# Patient Record
Sex: FEMALE | Race: WHITE | NOT HISPANIC OR LATINO | Employment: FULL TIME | URBAN - METROPOLITAN AREA
[De-identification: names, ages, dates, MRNs, and addresses within clinical notes are randomized per-mention and may not be internally consistent; named-entity substitution may affect disease eponyms.]

---

## 2024-11-18 ENCOUNTER — OFFICE VISIT (OUTPATIENT)
Dept: ORTHOPEDIC SURGERY | Facility: CLINIC | Age: 44
End: 2024-11-18
Payer: MEDICAID

## 2024-11-18 VITALS
DIASTOLIC BLOOD PRESSURE: 78 MMHG | BODY MASS INDEX: 24.04 KG/M2 | HEART RATE: 69 BPM | HEIGHT: 67 IN | WEIGHT: 153.2 LBS | SYSTOLIC BLOOD PRESSURE: 113 MMHG

## 2024-11-18 DIAGNOSIS — S83.511A RUPTURE OF ANTERIOR CRUCIATE LIGAMENT OF RIGHT KNEE, INITIAL ENCOUNTER: ICD-10-CM

## 2024-11-18 DIAGNOSIS — M25.561 RIGHT KNEE PAIN, UNSPECIFIED CHRONICITY: Primary | ICD-10-CM

## 2024-11-18 DIAGNOSIS — S83.231A COMPLEX TEAR OF MEDIAL MENISCUS OF RIGHT KNEE AS CURRENT INJURY, INITIAL ENCOUNTER: ICD-10-CM

## 2024-11-18 RX ORDER — ACETAMINOPHEN 325 MG/1
1000 TABLET ORAL ONCE
OUTPATIENT
Start: 2024-11-18 | End: 2024-11-18

## 2024-11-18 RX ORDER — PREGABALIN 150 MG/1
150 CAPSULE ORAL ONCE
OUTPATIENT
Start: 2024-11-18 | End: 2024-11-18

## 2024-11-18 RX ORDER — MELOXICAM 7.5 MG/1
15 TABLET ORAL ONCE
OUTPATIENT
Start: 2024-11-18 | End: 2024-11-18

## 2024-11-18 NOTE — PROGRESS NOTES
Subjective:     Patient ID: Becca Fonseca is a 44 y.o. female.    Chief Complaint:  Right knee pain, new patient    History of Present Illness  History of Present Illness  Becca presents clinic today for evaluation of right knee pain and instability initially started with an episode when she fell in a mole hole approximately 5 years ago which caused her knee to twist with a sudden onset of sharp pain and a pop as well as swelling.  Since then she has had intermittent instability episodes to her knee which she is treated primarily with bracing, activity modification, icing, and anti-inflammatory medications.  However approximately 6 to 7 weeks ago she started having increasing symptoms with instability as well as new onset of locking with pain localized primarily to the medial aspect of her knee at that point in time.  Since then she has had to wear a brace fairly consistently in order to do basic activities of daily living even on flat level ground.  Pain is symptoms are exacerbated with any uneven surfaces or rotational activities especially with any kind of cutting or pivoting.  Rates associated pain with these episodes happen as a 7-8 out of 10 dull in nature with associated sharp type pain as well particular of the medial joint line.  She has noted associated swelling stiffness and popping and catching and clicking as noted above.  Symptoms are worse with standing uneven surfaces walking working running and climbing stairs.  She has not had any intra-articular injections or previous surgery.  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     Social History     Occupational History    Not on file   Tobacco Use    Smoking status: Every Day     Current packs/day: 0.75     Average packs/day: 0.8 packs/day for 3.9 years (2.9 ttl pk-yrs)     Types: Cigarettes     Start date: 2021     Passive exposure: Current    Smokeless tobacco: Never   Vaping Use    Vaping status: Never Used   Substance  "and Sexual Activity    Alcohol use: Yes     Comment: 1    Drug use: Never    Sexual activity: Defer      History reviewed. No pertinent past medical history.  Past Surgical History:   Procedure Laterality Date    TUBAL ABDOMINAL LIGATION  2007       History reviewed. No pertinent family history.      Review of Systems        Objective:  Vitals:    11/18/24 1432   BP: 113/78   Pulse: 69   Weight: 69.5 kg (153 lb 3.2 oz)   Height: 170.2 cm (67\")         11/18/24  1432   Weight: 69.5 kg (153 lb 3.2 oz)     Body mass index is 23.99 kg/m².  Physical Exam    Vital signs reviewed.   General: No acute distress, alert and oriented  Eyes: conjunctiva clear; pupils equally round and reactive  ENT: external ears and nose atraumatic; oropharynx clear  CV: no peripheral edema  Resp: normal respiratory effort  Skin: no rashes or wounds; normal turgor  Psych: mood and affect appropriate; recent and remote memory intact          Physical Exam         Right knee-active range of motion 2 to 125 degrees, 4+ out of 5 strength on flexion extension, grade 2B Lachman, 2+ tender drawer soft endpoint, negative posterior drawer, negative posterior lateral drawer, negative dial test stable to varus and valgus stress at 2 and 30 degrees.  Mild effusion.  Significant tenderness along medial joint line with positive Jeffery exam with pain as well as clicking.  Minimally positive active patellar compression test.  No hip pain on logroll or Stinchfield exam.  Negative straight leg raise left lower extremity.  Brisk cap refill all digits, 2+ dorsalis pedis pulse.    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      Right Knee X-Ray  Indication: Pain    AP, Lateral, and [place] views    Findings:  No fracture  No bony lesion  Normal soft tissues  Normal joint spaces    No " prior studies were available for comparison.      Assessment:        1. Right knee pain, unspecified chronicity    2. Rupture of anterior cruciate ligament of right knee, initial encounter    3. Complex tear of medial meniscus of right knee as current injury, initial encounter           Plan:          Assessment & Plan  Reviewed treatment options at length with patient in regards to her right knee including but not limited to observation, bracing, physical therapy, and surgical treatment with ACL reconstruction and meniscal repair versus meniscectomy.  At this point in time given failure of conservative treatment as noted above in HPI as well as persistent lack of confidence and instability in her knee with advanced imaging consistent with ACL rupture and meniscal tear, we discussed options and she wishes to proceed with surgical treatment at this point in time.    Patient would like to proceed with right knee ACL reconstruction with hamstring autograft, possible allograft supplement, meniscal and cartilage surgery as indicated and all associated procedures. Reviewed anatomy of the knee, as well as details of procedure, including risks, benefits, and alternatives. Risks discussed included but were not limited to bleeding, infection, neurovascular damage, re-tear of ACL graft, failure of incorporation of graft, disease transmission, chronic pain, laxity, swelling, stiffness, loss of motion, weakness, instability, fracture, re-tear of meniscus, DVT, pulmonary embolus, death, stroke, complex regional pain syndrome, myocardial infarction, and need for additional procedures. Patient understood all these and had all questions answered, and verbally consented to proceeding with surgery. We discussed typical postop recovery of 6-12 months and no guarantee of being able to return to previous activities. No guarantees were given in regards to the procedure. We will plan on proceeding with surgery at next available date.      We did discuss that if we do proceed with repair she would likely need to be on limited weightbearing status for up to 4 to 6 weeks afterwards.  She understood this and does wish to proceed.    She denies any history of DVT or pulmonary embolus, denies cardiac history, she is not diabetic      Becca Fonseca was in agreement with plan and had all questions answered.     Orders:  Orders Placed This Encounter   Procedures    IMAGING SCANNED    XR Knee 3+ View With Levan Right       Medications:  No orders of the defined types were placed in this encounter.      Followup:  No follow-ups on file.    Diagnoses and all orders for this visit:    1. Right knee pain, unspecified chronicity (Primary)  -     XR Knee 3+ View With Levan Right    2. Rupture of anterior cruciate ligament of right knee, initial encounter    3. Complex tear of medial meniscus of right knee as current injury, initial encounter    Other orders  -     IMAGING SCANNED          BMI cannot be calculated due to outdated height or weight values.  Please input a current height/weight in Vitals and re-renter BMIFOLLOWUP in Note to pull in correct documentation based on BMI range.     Becca Fonseca  reports that she has been smoking cigarettes. She started smoking about 3 years ago. She has a 2.9 pack-year smoking history. She has been exposed to tobacco smoke. She has never used smokeless tobacco. I have educated her on the risk of diseases from using tobacco products such as cancer, COPD, and heart disease.     I advised her to quit and she is willing to quit. We have discussed the following method/s for tobacco cessation:  Education Material.  Together we have set a quit date for 2 weeks from today.   I spent 3  minutes counseling the patient.           Dictated utilizing Dragon dictation     Patient or patient representative verbalized consent for the use of Ambient Listening during the visit with  David Mortensen MD for chart  documentation. 11/18/2024  14:50 EST

## 2024-11-25 ENCOUNTER — PATIENT ROUNDING (BHMG ONLY) (OUTPATIENT)
Dept: ORTHOPEDIC SURGERY | Facility: CLINIC | Age: 44
End: 2024-11-25
Payer: MEDICAID

## 2024-11-25 NOTE — PROGRESS NOTES
A card has been sent to the patient for PATIENT ROUNDING with [facility].

## 2024-12-09 ENCOUNTER — PRE-ADMISSION TESTING (OUTPATIENT)
Dept: PREADMISSION TESTING | Facility: HOSPITAL | Age: 44
End: 2024-12-09

## 2024-12-09 VITALS
OXYGEN SATURATION: 99 % | WEIGHT: 153 LBS | HEART RATE: 83 BPM | BODY MASS INDEX: 24.01 KG/M2 | HEIGHT: 67 IN | RESPIRATION RATE: 16 BRPM | SYSTOLIC BLOOD PRESSURE: 118 MMHG | DIASTOLIC BLOOD PRESSURE: 73 MMHG

## 2024-12-09 DIAGNOSIS — S83.231A COMPLEX TEAR OF MEDIAL MENISCUS OF RIGHT KNEE AS CURRENT INJURY, INITIAL ENCOUNTER: ICD-10-CM

## 2024-12-09 DIAGNOSIS — S83.511A RUPTURE OF ANTERIOR CRUCIATE LIGAMENT OF RIGHT KNEE, INITIAL ENCOUNTER: ICD-10-CM

## 2024-12-09 LAB
APTT PPP: 23.5 SECONDS (ref 24.3–38.1)
BASOPHILS # BLD AUTO: 0.07 10*3/MM3 (ref 0–0.2)
BASOPHILS NFR BLD AUTO: 0.7 % (ref 0–1.5)
DEPRECATED RDW RBC AUTO: 41.1 FL (ref 37–54)
EOSINOPHIL # BLD AUTO: 0.13 10*3/MM3 (ref 0–0.4)
EOSINOPHIL NFR BLD AUTO: 1.3 % (ref 0.3–6.2)
ERYTHROCYTE [DISTWIDTH] IN BLOOD BY AUTOMATED COUNT: 12.3 % (ref 12.3–15.4)
HBA1C MFR BLD: 5.33 % (ref 4.8–5.6)
HCG SERPL QL: NEGATIVE
HCT VFR BLD AUTO: 39.5 % (ref 34–46.6)
HGB BLD-MCNC: 13.2 G/DL (ref 12–15.9)
IMM GRANULOCYTES # BLD AUTO: 0.02 10*3/MM3 (ref 0–0.05)
IMM GRANULOCYTES NFR BLD AUTO: 0.2 % (ref 0–0.5)
INR PPP: 0.85 (ref 0.9–1.1)
LYMPHOCYTES # BLD AUTO: 2.57 10*3/MM3 (ref 0.7–3.1)
LYMPHOCYTES NFR BLD AUTO: 26 % (ref 19.6–45.3)
MCH RBC QN AUTO: 30.8 PG (ref 26.6–33)
MCHC RBC AUTO-ENTMCNC: 33.4 G/DL (ref 31.5–35.7)
MCV RBC AUTO: 92.1 FL (ref 79–97)
MONOCYTES # BLD AUTO: 0.69 10*3/MM3 (ref 0.1–0.9)
MONOCYTES NFR BLD AUTO: 7 % (ref 5–12)
NEUTROPHILS NFR BLD AUTO: 6.41 10*3/MM3 (ref 1.7–7)
NEUTROPHILS NFR BLD AUTO: 64.8 % (ref 42.7–76)
NRBC BLD AUTO-RTO: 0 /100 WBC (ref 0–0.2)
PLATELET # BLD AUTO: 256 10*3/MM3 (ref 140–450)
PMV BLD AUTO: 10.3 FL (ref 6–12)
PROTHROMBIN TIME: 12 SECONDS (ref 12.1–15)
RBC # BLD AUTO: 4.29 10*6/MM3 (ref 3.77–5.28)
WBC NRBC COR # BLD AUTO: 9.89 10*3/MM3 (ref 3.4–10.8)

## 2024-12-09 PROCEDURE — 85730 THROMBOPLASTIN TIME PARTIAL: CPT | Performed by: ORTHOPAEDIC SURGERY

## 2024-12-09 PROCEDURE — 84703 CHORIONIC GONADOTROPIN ASSAY: CPT | Performed by: ORTHOPAEDIC SURGERY

## 2024-12-09 PROCEDURE — 83036 HEMOGLOBIN GLYCOSYLATED A1C: CPT | Performed by: ORTHOPAEDIC SURGERY

## 2024-12-09 PROCEDURE — 85610 PROTHROMBIN TIME: CPT | Performed by: ORTHOPAEDIC SURGERY

## 2024-12-09 PROCEDURE — 36415 COLL VENOUS BLD VENIPUNCTURE: CPT

## 2024-12-09 PROCEDURE — 85025 COMPLETE CBC W/AUTO DIFF WBC: CPT | Performed by: ORTHOPAEDIC SURGERY

## 2024-12-09 NOTE — DISCHARGE INSTRUCTIONS
PRE-ADMISSION TESTING INSTRUCTIONS FOR ADULTS    Take these medications the morning of surgery with a small sip of water:  nothing       Do not take any insulin or diabetes medications the morning of surgery.      No aspirin, advil, aleve, ibuprofen, naproxen, diet pills, decongestants, or herbal/vitamins for a week prior to surgery.       Tylenol/Acetaminophen is okay to take if needed.    General Instructions:    DO NOT EAT SOLID FOOD AFTER MIDNIGHT THE NIGHT BEFORE SURGERY. No gum, mints, or hard candy after midnight the night before surgery.  You may drink clear liquids the day of surgery up until 2 hours before your arrival time.  Clear liquids are liquids you can see through. Nothing RED in color.    Plain water    Sports drinks      Gelatin (Jell-O)  Fruit juices without pulp such as white grape juice and apple juice  Popsicles that contain no fruit or yogurt  Tea or coffee (no cream or milk added)    It is beneficial for you to have a clear drink that contains carbohydrates 2 hours before your arrival time.  We suggest a 20 ounce bottle of Gatorade or Powerade for non-diabetic patients or a 20 ounce bottle of Gatorade Zero or Powerade Zero for diabetic patients.     Patients who avoid smoking, chewing tobacco and alcohol for 4 weeks prior to surgery have a reduced risk of post-operative complications.  If at all possible, quit smoking as many days before surgery as you can.    Do not smoke, use chewing tobacco or drink alcohol the day of surgery    Bring your C-PAP/ BI-PAP machine if you use one.  Wear clean comfortable clothes.  Do not wear contact lenses, lotion, deodorant, or make-up.  Bring a case for your glasses if applicable. You may brush your teeth the morning of surgery.  You may wear dentures/partials, do not put adhesive/glue on them.  Leave all other jewelry and valuables at home.      Preventing a Surgical Site Infection:    Shower the night before and on the morning of surgery using the  chlorhexidine soap you were given.  Use a clean washcloth with the soap.  Place clean sheets on your bed after showering the night before surgery. Do not use the CHG soap on your hair, face, or private areas. Wash your body gently for five (5) minutes. Do not scrub your skin.  Dry with a clean towel and dress in clean clothing.  Do not shave the surgical area for 10 days-2 weeks prior to surgery  because the razor can irritate skin and make it easier to develop an infection.  Make sure you, your family, and all healthcare providers clean their hands with soap and water or an alcohol based hand  before caring for you or your wound.      Day of surgery:    Your surgeon’s office will advise you of your arrival time for the day of surgery.    Upon arrival, a Pre-op nurse and Anesthesia provider will review your health history, obtain vital signs, and answer questions you may have. The anesthesia provider will also discuss the type of anesthesia that will be needed for your procedure, which may include general anesthesia. The only belongings needed at this time will be your home medications and if applicable your C-PAP/BI-PAP machine.  If you are staying overnight your family can leave the rest of your belongings in the car and bring them to your room later.  A Pre-op nurse will start an IV and you may receive medication in preparation for surgery, including something to help you relax.  Your family will be able to see you in the Pre-op area.  While you are in surgery your family should notify the waiting room  if they leave the waiting room area and provide a contact phone number.    IF you have any questions, you can call the Pre-Admission Department at [phone] or your surgeon's office.  Notify your surgeon if  you become sick, have a fever, productive cough, or cannot be here the day of surgery    Please be aware that surgery does come with discomfort.  We want to make every effort to  control your discomfort so please discuss any uncontrolled symptoms with your nurse.   Your doctor will most likely have prescribed pain medications.      If you are going home after surgery, you will receive individualized written care instructions before being discharged.  A responsible adult (over the age of 18) must drive you to and from the hospital on the day of your surgery and stay with you for 24 hours after anesthesia.    If you are staying overnight following surgery, you will be transported to your hospital room following the recovery period.  [facility] has all private rooms.    You may receive a survey regarding the care you received. Your feedback is very important and will be used to collect the necessary data to help us to continue to provide excellent care.     Deductibles and co-payments are collected on the day of service. Please be prepared to pay the required co-pay, deductible or deposit on the day of service as defined by your plan.

## 2024-12-11 ENCOUNTER — ANESTHESIA EVENT (OUTPATIENT)
Dept: PERIOP | Facility: HOSPITAL | Age: 44
End: 2024-12-11

## 2024-12-12 ENCOUNTER — ANESTHESIA (OUTPATIENT)
Dept: PERIOP | Facility: HOSPITAL | Age: 44
End: 2024-12-12

## 2024-12-12 ENCOUNTER — HOSPITAL ENCOUNTER (OUTPATIENT)
Facility: HOSPITAL | Age: 44
Setting detail: HOSPITAL OUTPATIENT SURGERY
Discharge: HOME | End: 2024-12-12
Attending: ORTHOPAEDIC SURGERY | Admitting: ORTHOPAEDIC SURGERY

## 2024-12-12 VITALS
HEART RATE: 59 BPM | TEMPERATURE: 98.8 F | BODY MASS INDEX: 23.18 KG/M2 | WEIGHT: 148 LBS | RESPIRATION RATE: 16 BRPM | SYSTOLIC BLOOD PRESSURE: 95 MMHG | DIASTOLIC BLOOD PRESSURE: 60 MMHG | OXYGEN SATURATION: 99 %

## 2024-12-12 DIAGNOSIS — S83.231A COMPLEX TEAR OF MEDIAL MENISCUS OF RIGHT KNEE AS CURRENT INJURY, INITIAL ENCOUNTER: ICD-10-CM

## 2024-12-12 DIAGNOSIS — S83.511A RUPTURE OF ANTERIOR CRUCIATE LIGAMENT OF RIGHT KNEE, INITIAL ENCOUNTER: ICD-10-CM

## 2024-12-12 PROCEDURE — 29888 ARTHRS AID ACL RPR/AGMNTJ: CPT | Performed by: SPECIALIST/TECHNOLOGIST, OTHER

## 2024-12-12 PROCEDURE — 25010000002 SUCCINYLCHOLINE PER 20 MG: Performed by: NURSE ANESTHETIST, CERTIFIED REGISTERED

## 2024-12-12 PROCEDURE — 25010000002 LIDOCAINE 2% SOLUTION: Performed by: NURSE ANESTHETIST, CERTIFIED REGISTERED

## 2024-12-12 PROCEDURE — C1713 ANCHOR/SCREW BN/BN,TIS/BN: HCPCS | Performed by: ORTHOPAEDIC SURGERY

## 2024-12-12 PROCEDURE — 25010000002 FENTANYL CITRATE (PF) 50 MCG/ML SOLUTION: Performed by: NURSE ANESTHETIST, CERTIFIED REGISTERED

## 2024-12-12 PROCEDURE — 25010000002 BUPIVACAINE (PF) 0.5 % SOLUTION

## 2024-12-12 PROCEDURE — 25010000002 MIDAZOLAM PER 1MG

## 2024-12-12 PROCEDURE — 25010000002 HYDROMORPHONE 1 MG/ML SOLUTION: Performed by: NURSE ANESTHETIST, CERTIFIED REGISTERED

## 2024-12-12 PROCEDURE — 25010000002 BUPIVACAINE (PF) 0.25 % SOLUTION

## 2024-12-12 PROCEDURE — 29888 ARTHRS AID ACL RPR/AGMNTJ: CPT | Performed by: ORTHOPAEDIC SURGERY

## 2024-12-12 PROCEDURE — 25810000003 SODIUM CHLORIDE 0.9 % SOLUTION

## 2024-12-12 PROCEDURE — 29882 ARTHRS KNE SRG MNISC RPR M/L: CPT | Performed by: SPECIALIST/TECHNOLOGIST, OTHER

## 2024-12-12 PROCEDURE — 25010000002 PROPOFOL 200 MG/20ML EMULSION: Performed by: NURSE ANESTHETIST, CERTIFIED REGISTERED

## 2024-12-12 PROCEDURE — 25010000002 LIDOCAINE PF 2% 2 % SOLUTION

## 2024-12-12 PROCEDURE — 29882 ARTHRS KNE SRG MNISC RPR M/L: CPT | Performed by: ORTHOPAEDIC SURGERY

## 2024-12-12 PROCEDURE — 25810000003 LACTATED RINGERS PER 1000 ML

## 2024-12-12 PROCEDURE — 25010000002 SUGAMMADEX 200 MG/2ML SOLUTION: Performed by: NURSE ANESTHETIST, CERTIFIED REGISTERED

## 2024-12-12 PROCEDURE — 25010000002 ONDANSETRON PER 1 MG

## 2024-12-12 PROCEDURE — 25010000002 DEXAMETHASONE PER 1 MG

## 2024-12-12 PROCEDURE — 25010000002 CEFAZOLIN PER 500 MG: Performed by: ORTHOPAEDIC SURGERY

## 2024-12-12 DEVICE — DEV CONTRL TISS STRATAFIX SPIRAL MNCRYL UD 3/0 PLS 30CM: Type: IMPLANTABLE DEVICE | Site: KNEE | Status: FUNCTIONAL

## 2024-12-12 DEVICE — SPIKED WASHER 14MM: Type: IMPLANTABLE DEVICE | Site: KNEE | Status: FUNCTIONAL

## 2024-12-12 DEVICE — BIOSURE REGENSORB INTERFERENCE                                    SCREW 8 MM X 25MM
Type: IMPLANTABLE DEVICE | Site: KNEE | Status: FUNCTIONAL
Brand: BIOSURE

## 2024-12-12 DEVICE — SUT NONABS LOOPED W/STR/NDL COBR SZ2 20IN WHT/BLU: Type: IMPLANTABLE DEVICE | Site: KNEE | Status: FUNCTIONAL

## 2024-12-12 DEVICE — FAST-FIX 360 REVERSED CURVE                                    MENISCAL REPAIR SYSTEM
Type: IMPLANTABLE DEVICE | Site: KNEE | Status: FUNCTIONAL
Brand: FAST-FIX

## 2024-12-12 DEVICE — ULTRABRAID NO.2 WHITE SUTURE AND                                    NEEDLE ASSEMBLY, 38 INCH, 10 PER                                    BOX, STERILE
Type: IMPLANTABLE DEVICE | Site: KNEE | Status: FUNCTIONAL
Brand: ULTRABRAID

## 2024-12-12 DEVICE — SUT NONABS LOOPED W/STR/NDL SZ2 20IN BLU: Type: IMPLANTABLE DEVICE | Site: KNEE | Status: FUNCTIONAL

## 2024-12-12 DEVICE — ULTRABUTTON ADJUSTABLE FIXATION DEVICE
Type: IMPLANTABLE DEVICE | Site: KNEE | Status: FUNCTIONAL
Brand: ULTRABUTTON

## 2024-12-12 DEVICE — ULTRATAPE 2MM BLUE 38 PKG OF 6: Type: IMPLANTABLE DEVICE | Site: KNEE | Status: FUNCTIONAL

## 2024-12-12 DEVICE — CORTICAL SCREW 4.5MM X 32MM: Type: IMPLANTABLE DEVICE | Site: KNEE | Status: FUNCTIONAL

## 2024-12-12 DEVICE — FAST-FIX 360 CURVED MENISCAL                                    REPAIR SYSTEM
Type: IMPLANTABLE DEVICE | Site: KNEE | Status: FUNCTIONAL
Brand: FAST-FIX

## 2024-12-12 RX ORDER — SODIUM CHLORIDE 9 MG/ML
40 INJECTION, SOLUTION INTRAVENOUS AS NEEDED
Status: DISCONTINUED | OUTPATIENT
Start: 2024-12-12 | End: 2024-12-12 | Stop reason: HOSPADM

## 2024-12-12 RX ORDER — ROCURONIUM BROMIDE 10 MG/ML
INJECTION, SOLUTION INTRAVENOUS AS NEEDED
Status: DISCONTINUED | OUTPATIENT
Start: 2024-12-12 | End: 2024-12-12 | Stop reason: SURG

## 2024-12-12 RX ORDER — BUPIVACAINE HYDROCHLORIDE 2.5 MG/ML
INJECTION, SOLUTION EPIDURAL; INFILTRATION; INTRACAUDAL
Status: COMPLETED | OUTPATIENT
Start: 2024-12-12 | End: 2024-12-12

## 2024-12-12 RX ORDER — MINERAL OIL
OIL (ML) MISCELLANEOUS AS NEEDED
Status: DISCONTINUED | OUTPATIENT
Start: 2024-12-12 | End: 2024-12-12 | Stop reason: HOSPADM

## 2024-12-12 RX ORDER — SENNOSIDES A AND B 8.6 MG/1
1 TABLET, FILM COATED ORAL NIGHTLY
Qty: 20 TABLET | Refills: 0 | Status: SHIPPED | OUTPATIENT
Start: 2024-12-12

## 2024-12-12 RX ORDER — DEXAMETHASONE SODIUM PHOSPHATE 10 MG/ML
8 INJECTION INTRAMUSCULAR; INTRAVENOUS ONCE AS NEEDED
Status: COMPLETED | OUTPATIENT
Start: 2024-12-12 | End: 2024-12-12

## 2024-12-12 RX ORDER — LIDOCAINE HYDROCHLORIDE 20 MG/ML
INJECTION, SOLUTION EPIDURAL; INFILTRATION; INTRACAUDAL; PERINEURAL
Status: COMPLETED | OUTPATIENT
Start: 2024-12-12 | End: 2024-12-12

## 2024-12-12 RX ORDER — MELOXICAM 7.5 MG/1
15 TABLET ORAL ONCE
Status: COMPLETED | OUTPATIENT
Start: 2024-12-12 | End: 2024-12-12

## 2024-12-12 RX ORDER — DEXMEDETOMIDINE HYDROCHLORIDE 100 UG/ML
INJECTION, SOLUTION INTRAVENOUS
Status: COMPLETED | OUTPATIENT
Start: 2024-12-12 | End: 2024-12-12

## 2024-12-12 RX ORDER — LIDOCAINE HYDROCHLORIDE 10 MG/ML
0.5 INJECTION, SOLUTION EPIDURAL; INFILTRATION; INTRACAUDAL; PERINEURAL ONCE AS NEEDED
Status: DISCONTINUED | OUTPATIENT
Start: 2024-12-12 | End: 2024-12-12 | Stop reason: HOSPADM

## 2024-12-12 RX ORDER — SCOLOPAMINE TRANSDERMAL SYSTEM 1 MG/1
1 PATCH, EXTENDED RELEASE TRANSDERMAL
Status: DISCONTINUED | OUTPATIENT
Start: 2024-12-12 | End: 2024-12-12 | Stop reason: HOSPADM

## 2024-12-12 RX ORDER — PREGABALIN 75 MG/1
150 CAPSULE ORAL ONCE
Status: COMPLETED | OUTPATIENT
Start: 2024-12-12 | End: 2024-12-12

## 2024-12-12 RX ORDER — ONDANSETRON 2 MG/ML
4 INJECTION INTRAMUSCULAR; INTRAVENOUS ONCE AS NEEDED
Status: COMPLETED | OUTPATIENT
Start: 2024-12-12 | End: 2024-12-12

## 2024-12-12 RX ORDER — FAMOTIDINE 10 MG/ML
20 INJECTION, SOLUTION INTRAVENOUS
Status: COMPLETED | OUTPATIENT
Start: 2024-12-12 | End: 2024-12-12

## 2024-12-12 RX ORDER — ONDANSETRON 4 MG/1
4 TABLET, FILM COATED ORAL EVERY 8 HOURS PRN
Qty: 30 TABLET | Refills: 0 | Status: SHIPPED | OUTPATIENT
Start: 2024-12-12

## 2024-12-12 RX ORDER — OXYCODONE AND ACETAMINOPHEN 5; 325 MG/1; MG/1
1 TABLET ORAL EVERY 4 HOURS PRN
Qty: 42 TABLET | Refills: 0 | Status: SHIPPED | OUTPATIENT
Start: 2024-12-12

## 2024-12-12 RX ORDER — DOXYCYCLINE 100 MG/1
100 CAPSULE ORAL 2 TIMES DAILY
Qty: 20 CAPSULE | Refills: 0 | Status: SHIPPED | OUTPATIENT
Start: 2024-12-12 | End: 2024-12-22

## 2024-12-12 RX ORDER — SUCCINYLCHOLINE CHLORIDE 20 MG/ML
INJECTION INTRAMUSCULAR; INTRAVENOUS AS NEEDED
Status: DISCONTINUED | OUTPATIENT
Start: 2024-12-12 | End: 2024-12-12 | Stop reason: SURG

## 2024-12-12 RX ORDER — ASPIRIN 81 MG/1
81 TABLET ORAL 2 TIMES DAILY
Qty: 28 TABLET | Refills: 0 | Status: SHIPPED | OUTPATIENT
Start: 2024-12-12 | End: 2024-12-26

## 2024-12-12 RX ORDER — PROPOFOL 10 MG/ML
INJECTION, EMULSION INTRAVENOUS AS NEEDED
Status: DISCONTINUED | OUTPATIENT
Start: 2024-12-12 | End: 2024-12-12 | Stop reason: SURG

## 2024-12-12 RX ORDER — ACETAMINOPHEN 500 MG
1000 TABLET ORAL ONCE
Status: COMPLETED | OUTPATIENT
Start: 2024-12-12 | End: 2024-12-12

## 2024-12-12 RX ORDER — ONDANSETRON 2 MG/ML
4 INJECTION INTRAMUSCULAR; INTRAVENOUS ONCE AS NEEDED
Status: DISCONTINUED | OUTPATIENT
Start: 2024-12-12 | End: 2024-12-12 | Stop reason: HOSPADM

## 2024-12-12 RX ORDER — SODIUM CHLORIDE, SODIUM LACTATE, POTASSIUM CHLORIDE, CALCIUM CHLORIDE 600; 310; 30; 20 MG/100ML; MG/100ML; MG/100ML; MG/100ML
100 INJECTION, SOLUTION INTRAVENOUS ONCE
Status: DISCONTINUED | OUTPATIENT
Start: 2024-12-12 | End: 2024-12-12 | Stop reason: HOSPADM

## 2024-12-12 RX ORDER — MIDAZOLAM HYDROCHLORIDE 2 MG/2ML
1 INJECTION, SOLUTION INTRAMUSCULAR; INTRAVENOUS
Status: DISCONTINUED | OUTPATIENT
Start: 2024-12-12 | End: 2024-12-12 | Stop reason: HOSPADM

## 2024-12-12 RX ORDER — SODIUM CHLORIDE, SODIUM LACTATE, POTASSIUM CHLORIDE, CALCIUM CHLORIDE 600; 310; 30; 20 MG/100ML; MG/100ML; MG/100ML; MG/100ML
100 INJECTION, SOLUTION INTRAVENOUS CONTINUOUS
Status: DISCONTINUED | OUTPATIENT
Start: 2024-12-12 | End: 2024-12-12 | Stop reason: HOSPADM

## 2024-12-12 RX ORDER — HYDROCODONE BITARTRATE AND ACETAMINOPHEN 7.5; 325 MG/1; MG/1
1 TABLET ORAL ONCE AS NEEDED
Status: COMPLETED | OUTPATIENT
Start: 2024-12-12 | End: 2024-12-12

## 2024-12-12 RX ORDER — SODIUM CHLORIDE 0.9 % (FLUSH) 0.9 %
10 SYRINGE (ML) INJECTION EVERY 12 HOURS SCHEDULED
Status: DISCONTINUED | OUTPATIENT
Start: 2024-12-12 | End: 2024-12-12 | Stop reason: HOSPADM

## 2024-12-12 RX ORDER — LIDOCAINE HYDROCHLORIDE 20 MG/ML
INJECTION, SOLUTION INFILTRATION; PERINEURAL AS NEEDED
Status: DISCONTINUED | OUTPATIENT
Start: 2024-12-12 | End: 2024-12-12 | Stop reason: SURG

## 2024-12-12 RX ORDER — SODIUM CHLORIDE 0.9 % (FLUSH) 0.9 %
10 SYRINGE (ML) INJECTION AS NEEDED
Status: DISCONTINUED | OUTPATIENT
Start: 2024-12-12 | End: 2024-12-12 | Stop reason: HOSPADM

## 2024-12-12 RX ORDER — TRANEXAMIC ACID 10 MG/ML
1000 INJECTION, SOLUTION INTRAVENOUS ONCE
Status: COMPLETED | OUTPATIENT
Start: 2024-12-12 | End: 2024-12-12

## 2024-12-12 RX ORDER — METHOCARBAMOL 500 MG/1
750 TABLET, FILM COATED ORAL 4 TIMES DAILY
Status: DISCONTINUED | OUTPATIENT
Start: 2024-12-12 | End: 2024-12-12 | Stop reason: HOSPADM

## 2024-12-12 RX ORDER — MAGNESIUM HYDROXIDE 1200 MG/15ML
LIQUID ORAL AS NEEDED
Status: DISCONTINUED | OUTPATIENT
Start: 2024-12-12 | End: 2024-12-12 | Stop reason: HOSPADM

## 2024-12-12 RX ORDER — FENTANYL CITRATE 50 UG/ML
INJECTION, SOLUTION INTRAMUSCULAR; INTRAVENOUS AS NEEDED
Status: DISCONTINUED | OUTPATIENT
Start: 2024-12-12 | End: 2024-12-12 | Stop reason: SURG

## 2024-12-12 RX ADMIN — ONDANSETRON 4 MG: 2 INJECTION INTRAMUSCULAR; INTRAVENOUS at 11:22

## 2024-12-12 RX ADMIN — PREGABALIN 150 MG: 75 CAPSULE ORAL at 11:22

## 2024-12-12 RX ADMIN — BUPIVACAINE HYDROCHLORIDE 8 ML/HR: 5 INJECTION, SOLUTION EPIDURAL; INTRACAUDAL; PERINEURAL at 16:20

## 2024-12-12 RX ADMIN — LIDOCAINE HYDROCHLORIDE 3 ML: 20 INJECTION, SOLUTION EPIDURAL; INFILTRATION; INTRACAUDAL; PERINEURAL at 12:23

## 2024-12-12 RX ADMIN — LIDOCAINE HYDROCHLORIDE 3 ML: 20 INJECTION, SOLUTION EPIDURAL; INFILTRATION; INTRACAUDAL; PERINEURAL at 12:14

## 2024-12-12 RX ADMIN — LIDOCAINE HYDROCHLORIDE 50 MG: 20 INJECTION, SOLUTION INFILTRATION; PERINEURAL at 13:05

## 2024-12-12 RX ADMIN — SUCCINYLCHOLINE CHLORIDE 100 MG: 20 INJECTION, SOLUTION INTRAMUSCULAR; INTRAVENOUS at 13:07

## 2024-12-12 RX ADMIN — BUPIVACAINE HYDROCHLORIDE 15 ML: 2.5 INJECTION, SOLUTION EPIDURAL; INFILTRATION; INTRACAUDAL at 12:26

## 2024-12-12 RX ADMIN — MELOXICAM 15 MG: 7.5 TABLET ORAL at 11:22

## 2024-12-12 RX ADMIN — TRANEXAMIC ACID 1000 MG: 10 INJECTION, SOLUTION INTRAVENOUS at 13:20

## 2024-12-12 RX ADMIN — BUPIVACAINE HYDROCHLORIDE 10 ML: 2.5 INJECTION, SOLUTION EPIDURAL; INFILTRATION; INTRACAUDAL; PERINEURAL at 12:17

## 2024-12-12 RX ADMIN — MIDAZOLAM HYDROCHLORIDE 2 MG: 1 INJECTION, SOLUTION INTRAMUSCULAR; INTRAVENOUS at 12:04

## 2024-12-12 RX ADMIN — SUGAMMADEX 200 MG: 100 INJECTION, SOLUTION INTRAVENOUS at 15:24

## 2024-12-12 RX ADMIN — CEFAZOLIN 2000 MG: 2 INJECTION, POWDER, FOR SOLUTION INTRAVENOUS at 13:09

## 2024-12-12 RX ADMIN — FAMOTIDINE 20 MG: 10 INJECTION, SOLUTION INTRAVENOUS at 11:21

## 2024-12-12 RX ADMIN — ROCURONIUM 10 MG: 50 INJECTION, SOLUTION INTRAVENOUS at 14:35

## 2024-12-12 RX ADMIN — ROCURONIUM 40 MG: 50 INJECTION, SOLUTION INTRAVENOUS at 13:12

## 2024-12-12 RX ADMIN — FENTANYL CITRATE 50 MCG: 50 INJECTION, SOLUTION INTRAMUSCULAR; INTRAVENOUS at 13:05

## 2024-12-12 RX ADMIN — SCOPOLAMINE 1 PATCH: 1.5 PATCH, EXTENDED RELEASE TRANSDERMAL at 11:30

## 2024-12-12 RX ADMIN — PROPOFOL 150 MG: 10 INJECTION, EMULSION INTRAVENOUS at 13:06

## 2024-12-12 RX ADMIN — ROCURONIUM 20 MG: 50 INJECTION, SOLUTION INTRAVENOUS at 13:55

## 2024-12-12 RX ADMIN — HYDROCODONE BITARTRATE AND ACETAMINOPHEN 1 TABLET: 7.5; 325 TABLET ORAL at 16:39

## 2024-12-12 RX ADMIN — SODIUM CHLORIDE, POTASSIUM CHLORIDE, SODIUM LACTATE AND CALCIUM CHLORIDE: 600; 310; 30; 20 INJECTION, SOLUTION INTRAVENOUS at 12:59

## 2024-12-12 RX ADMIN — BUPIVACAINE HYDROCHLORIDE 10 ML: 2.5 INJECTION, SOLUTION EPIDURAL; INFILTRATION; INTRACAUDAL at 16:09

## 2024-12-12 RX ADMIN — DEXMEDETOMIDINE HYDROCHLORIDE 20 MCG: 100 INJECTION, SOLUTION INTRAVENOUS at 12:26

## 2024-12-12 RX ADMIN — HYDROMORPHONE HYDROCHLORIDE 0.5 MG: 1 INJECTION, SOLUTION INTRAMUSCULAR; INTRAVENOUS; SUBCUTANEOUS at 15:44

## 2024-12-12 RX ADMIN — DEXMEDETOMIDINE 20 MCG: 100 INJECTION, SOLUTION, CONCENTRATE INTRAVENOUS at 12:17

## 2024-12-12 RX ADMIN — ACETAMINOPHEN 1000 MG: 500 TABLET ORAL at 11:22

## 2024-12-12 RX ADMIN — HYDROMORPHONE HYDROCHLORIDE 0.5 MG: 1 INJECTION, SOLUTION INTRAMUSCULAR; INTRAVENOUS; SUBCUTANEOUS at 15:59

## 2024-12-12 RX ADMIN — DEXAMETHASONE SODIUM PHOSPHATE 8 MG: 10 INJECTION INTRAMUSCULAR; INTRAVENOUS at 11:21

## 2024-12-12 RX ADMIN — TRANEXAMIC ACID 1000 MG: 10 INJECTION, SOLUTION INTRAVENOUS at 15:04

## 2024-12-12 RX ADMIN — ROCURONIUM 10 MG: 50 INJECTION, SOLUTION INTRAVENOUS at 13:45

## 2024-12-12 NOTE — H&P
Orthopedic H&P    Subjective:     Patient ID: Becca Fonseca is a 44 y.o. female.    Chief Complaint:  Right knee pain, ACL tear and instability    History of Present Illness  History of Present Illness  Becca presents for surgical treatment of right knee pain and instability, initially started with an episode when she fell in a mole hole approximately 5 years ago which caused her knee to twist with a sudden onset of sharp pain and a pop as well as swelling.  Since then she has had intermittent instability episodes to her knee which she is treated primarily with bracing, activity modification, icing, and anti-inflammatory medications.  However approximately 6 to 7 weeks ago she started having increasing symptoms with instability as well as new onset of locking with pain localized primarily to the medial aspect of her knee at that point in time.  Since then she has had to wear a brace fairly consistently in order to do basic activities of daily living even on flat level ground.  Pain is symptoms are exacerbated with any uneven surfaces or rotational activities especially with any kind of cutting or pivoting.  Rates associated pain with these episodes happen as a 7-8 out of 10 dull in nature with associated sharp type pain as well particular of the medial joint line.  She has noted associated swelling stiffness and popping and catching and clicking as noted above.  Symptoms are worse with standing uneven surfaces walking working running and climbing stairs.  She has not had any intra-articular injections or previous surgery.  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    No current facility-administered medications on file prior to encounter.     No current outpatient medications on file prior to encounter.     No Known Allergies       Social History     Occupational History    Not on file   Tobacco Use    Smoking status: Every Day     Current packs/day: 0.75     Average packs/day: 0.8  packs/day for 3.9 years (3.0 ttl pk-yrs)     Types: Cigarettes     Start date: 2021     Passive exposure: Current    Smokeless tobacco: Never   Vaping Use    Vaping status: Never Used   Substance and Sexual Activity    Alcohol use: Yes     Comment: 1    Drug use: Never    Sexual activity: Defer      Past Medical History:   Diagnosis Date    PONV (postoperative nausea and vomiting)      Past Surgical History:   Procedure Laterality Date    TUBAL ABDOMINAL LIGATION  2007       History reviewed. No pertinent family history.      Review of Systems        Objective:  Vitals:    12/12/24 1128   BP: 112/84   Pulse: 68   Resp: 10   Temp: 98.6 °F (37 °C)   SpO2: 100%   Weight: 67.1 kg (148 lb)         12/12/24  1128   Weight: 67.1 kg (148 lb)     Body mass index is 23.18 kg/m².  Physical Exam    Vital signs reviewed.   General: No acute distress, alert and oriented  Eyes: conjunctiva clear; pupils equally round and reactive  ENT: external ears and nose atraumatic; oropharynx clear  CV: no peripheral edema  Resp: normal respiratory effort  Skin: no rashes or wounds; normal turgor  Psych: mood and affect appropriate; recent and remote memory intact  Debilities: None        Physical Exam         Right knee-active range of motion 2 to 125 degrees, 4+ out of 5 strength on flexion extension, grade 2B Lachman, 2+ tender drawer soft endpoint, negative posterior drawer, negative posterior lateral drawer, negative dial test stable to varus and valgus stress at 2 and 30 degrees.  Mild effusion.  Significant tenderness along medial joint line with positive Jeffery exam with pain as well as clicking.  Minimally positive active patellar compression test.  No hip pain on logroll or Stinchfield exam.  Negative straight leg raise left lower extremity.  Brisk cap refill all digits, 2+ dorsalis pedis pulse.    Imaging:      Reviewed outside MRI include review of imaging as well as radiology report indicates chronic high-grade tearing of the  proximal ACL with longitudinal posterior horn medial meniscus tear and patella chondromalacia.      Right Knee X-Ray  Indication: Pain    AP, Lateral, and [place] views    Findings:  No fracture  No bony lesion  Normal soft tissues  Normal joint spaces    No prior studies were available for comparison.      Assessment:        1. Right knee pain, unspecified chronicity    2. Rupture of anterior cruciate ligament of right knee, initial encounter    3. Complex tear of medial meniscus of right knee as current injury, initial encounter           Plan:          Assessment & Plan  Reviewed treatment options at length with patient in regards to her right knee including but not limited to observation, bracing, physical therapy, and surgical treatment with ACL reconstruction and meniscal repair versus meniscectomy.  At this point in time given failure of conservative treatment as noted above in HPI as well as persistent lack of confidence and instability in her knee with advanced imaging consistent with ACL rupture and meniscal tear, we discussed options and she wishes to proceed with surgical treatment at this point in time.    Patient would like to proceed with right knee ACL reconstruction with hamstring autograft, possible allograft supplement, meniscal and cartilage surgery as indicated and all associated procedures. Reviewed anatomy of the knee, as well as details of procedure, including risks, benefits, and alternatives. Risks discussed included but were not limited to bleeding, infection, neurovascular damage, re-tear of ACL graft, failure of incorporation of graft, disease transmission, chronic pain, laxity, swelling, stiffness, loss of motion, weakness, instability, fracture, re-tear of meniscus, DVT, pulmonary embolus, death, stroke, complex regional pain syndrome, myocardial infarction, and need for additional procedures. Patient understood all these and had all questions answered, and consented to proceeding with  surgery. We discussed typical postop recovery of 6-12 months and no guarantee of being able to return to previous activities. No guarantees were given in regards to the procedure.     We did discuss that if we do proceed with repair she would likely need to be on limited weightbearing status for up to 4 to 6 weeks afterwards.  She understood this and does wish to proceed.    She denies any history of DVT or pulmonary embolus, denies cardiac history, she is not diabetic      Becca Fonseca was in agreement with plan and had all questions answered.

## 2024-12-12 NOTE — ANESTHESIA PROCEDURE NOTES
Peripheral Block    Pre-sedation assessment completed: 12/12/2024 3:58 PM    Patient reassessed immediately prior to procedure    Patient location during procedure: post-op  Start time: 12/12/2024 4:05 PM  Stop time: 12/12/2024 4:09 PM  Reason for block: at surgeon's request and post-op pain management  Performed by  LENNY/CAA: Jennifer Macdonald CRNA  Preanesthetic Checklist  Completed: patient identified, IV checked, site marked, risks and benefits discussed, surgical consent, monitors and equipment checked, pre-op evaluation and timeout performed  Prep:  Pt Position: supine  Sterile barriers:cap, gloves, mask and washed/disinfected hands  Prep: ChloraPrep  Patient monitoring: blood pressure monitoring, continuous pulse oximetry and EKG  Procedure    Sedation: yes  Performed under: local infiltration  Guidance:ultrasound guided    ULTRASOUND INTERPRETATION.  Using ultrasound guidance a 21 G gauge needle was placed in close proximity to the nerve, at which point, under ultrasound guidance anesthetic was injected in the area of the nerve and spread of the anesthesia was seen on ultrasound in close proximity thereto.  There were no abnormalities seen on ultrasound; a digital image was taken; and the patient tolerated the procedure with no complications. Images:still images obtained, printed/placed on chart    Laterality:right  Block Type:femoral  Injection Technique:single-shot  Needle Type:echogenic  Needle Gauge:21 G  Resistance on Injection: none    Medications Used: bupivacaine PF (MARCAINE) injection 0.25% - Peripheral Nerve   10 mL - 12/12/2024 4:09:00 PM      Post Assessment  Injection Assessment: negative aspiration for heme, no paresthesia on injection and incremental injection  Patient Tolerance:comfortable throughout block  Complications:no  Performed by: Jennifer Macdonald CRNA

## 2024-12-12 NOTE — BRIEF OP NOTE
KNEE ANTERIOR CRUCIATE LIGAMENT RECONSTRUCTION WITH HAM STRING GRAFT  Progress Note    Becca Fonseca  12/12/2024    Pre-op Diagnosis:   Rupture of anterior cruciate ligament of right knee, initial encounter [S83.511A]  Complex tear of medial meniscus of right knee as current injury, initial encounter [S83.231A]       Post-Op Diagnosis Codes:     * Rupture of anterior cruciate ligament of right knee, initial encounter [S83.511A]     * Complex tear of medial meniscus of right knee as current injury, initial encounter [S83.231A]    Procedure/CPT® Codes:        Procedure(s):  Right Knee arthroscopy, anterior cruciate ligament reconstruction with hamstring autograft, medial meniscal repair          Surgeon(s):  David Mortensen MD    Anesthesia: General with Block    Staff:   Circulator: Hiwot Irby RN  Scrub Person: Sheryl Alberto; Myesha Lantigua; Aliyah Loomis  Vendor Representative: Zeke Barger  Assistant: Molly Rodriguez CSA; Dipesh William CSA  Assistant: Molly Rodriguez CSA; Dipesh William CSA      Estimated Blood Loss: <500ml    Urine Voided: * No values recorded between 12/12/2024 12:59 PM and 12/12/2024  2:56 PM *    Specimens:                None          Drains: * No LDAs found *    Findings: Right knee ACL tear, medial meniscal tear        Complications: none    Assistant: Molly Rodriguez CSA; Dipesh William CSA  was responsible for performing the following activities: Retraction, Irrigation, Closing, and Placing Dressing and their skilled assistance was necessary for the success of this case.    David Mortensen MD     Date: 12/12/2024  Time: 15:07 EST

## 2024-12-12 NOTE — ANESTHESIA PROCEDURE NOTES
Peripheral Block    Pre-sedation assessment completed: 12/12/2024 12:03 PM    Patient reassessed immediately prior to procedure    Patient location during procedure: pre-op  Start time: 12/12/2024 12:23 PM  Stop time: 12/12/2024 12:26 PM  Reason for block: procedure for pain, at surgeon's request, post-op pain management and secondary anesthetic  Performed by  CRNA/CAA: Rula Tan, CRNA  Preanesthetic Checklist  Completed: patient identified, IV checked, site marked, risks and benefits discussed, surgical consent, monitors and equipment checked, pre-op evaluation and timeout performed  Prep:  Pt Position: supine  Sterile barriers:cap, gloves, mask and washed/disinfected hands  Prep: ChloraPrep  Patient monitoring: blood pressure monitoring, continuous pulse oximetry and EKG  Procedure    Sedation: yes  Performed under: local infiltration  Guidance:ultrasound guided    ULTRASOUND INTERPRETATION.  Using ultrasound guidance a 21 G gauge needle was placed in close proximity to the nerve, at which point, under ultrasound guidance anesthetic was injected in the area of the nerve and spread of the anesthesia was seen on ultrasound in close proximity thereto.  There were no abnormalities seen on ultrasound; a digital image was taken; and the patient tolerated the procedure with no complications. Images:still images obtained, printed/placed on chart    Laterality:right  Block Type:iPack  Injection Technique:single-shot  Needle Type:echogenic  Needle Gauge:21 G  Resistance on Injection: none    Medications Used: dexmedetomidine HCl (PRECEDEX) injection - Perineural   20 mcg - 12/12/2024 12:26:00 PM  bupivacaine PF (MARCAINE) injection 0.25% - Peripheral Nerve   15 mL - 12/12/2024 12:26:00 PM  lidocaine PF 2% (XYLOCAINE) injection - Infiltration   3 mL - 12/12/2024 12:23:00 PM      Post Assessment  Injection Assessment: negative aspiration for heme, no paresthesia on injection and incremental injection  Patient  Tolerance:comfortable throughout block  Complications:no  Performed by: Rula Tan CRNA

## 2024-12-12 NOTE — ANESTHESIA PREPROCEDURE EVALUATION
Anesthesia Evaluation     Patient summary reviewed and Nursing notes reviewed   history of anesthetic complications:  PONV  NPO Solid Status: > 8 hours  NPO Liquid Status: > 2 hours           Airway   Mallampati: II  TM distance: >3 FB  Neck ROM: full  No difficulty expected  Dental      Comment: Scattered crowns, caps, and fillings.      Pulmonary - normal exam    breath sounds clear to auscultation  (+) a smoker Current, Smoked day of surgery, cigarettes,  Cardiovascular - negative cardio ROS  Exercise tolerance: excellent (>7 METS)    Rhythm: regular  Rate: normal        Neuro/Psych- negative ROS  GI/Hepatic/Renal/Endo - negative ROS     Musculoskeletal     Abdominal    Substance History   (+) alcohol use (glass of wine 2 times per month)  (-) drug use     OB/GYN          Other                          Anesthesia Plan    ASA 1     general, regional and general with block       Anesthetic plan, risks, benefits, and alternatives have been provided, discussed and informed consent has been obtained with: patient.  Pre-procedure education provided  Use of blood products discussed with patient  Consented to blood products.    Plan discussed with CRNA.        CODE STATUS:

## 2024-12-12 NOTE — OP NOTE
Date of Surgery: 12/12/2024    PREOPERATIVE DIAGNOSES:  1. Right knee anterior cruciate ligament tear.    2. Right knee medial meniscal tear.     POSTOPERATIVE DIAGNOSES:  1. Right knee anterior cruciate ligament tear.    2. Right knee medial meniscal tear.     PROCEDURES PERFORMED:    1. Right knee anterior cruciate ligament reconstruction with hamstring autograft.    2. Right knee medial meniscus repair.     SURGEON: David Mortensen MD     ASSISTANT: Assistant: Molly Rodriguez CSA; Dipesh William CSA was responsible for performing the following activities: Retraction, Irrigation, Suturing, Closing, and Placing Dressing and their skilled assistance was necessary for the success of this case.    ANESTHESIA: general, regional, general with block    ESTIMATED BLOOD LOSS: <500ml    URINE OUTPUT: Not recorded.     FLUIDS: Per Anesthesia.     COMPLICATION: None.     SPECIMEN: None.     DRAIN: None.     Tourniquet Times:     Inflated: 12/12/2024  1:33 PM     Deflated: 12/12/2024  3:13 PM    IMPLANT:  Hamstring autograft, 5-strand, 8.5 mm in size on tibial and femoral sides, Smith and Nephew adjustable loop Endobutton, Smith & Nephew 8 x 25 mm Biosure interference screw, Mitek 32 mm screw with 14 mm washer for secondary tibial fixation.  Smith & Nephew FasT-Fix meniscal repair device x 4    EXAMINATION UNDER ANESTHESIA: Passive range of motion of right knee is  0° to  140°, minimal effusion, stable to varus and valgus stress 0° and 30°. Positive pivot shift, grade 2B Lachman, 2+ anterior drawer. No endpoint, negative posterior drawer, negative posterolateral drawer, negative dial test. Midline patellar tracking and 2+ dorsalis pedis pulse right foot.     ARTHROSCOPIC FINDINGS:    1. Suprapatellar pouch: Free of loose bodies.    2. Medial and lateral gutters: Free of loose bodies.    3. Patellofemoral joint: No evidence of articular cartilage wear.    4. Medial compartment: Peripheral vertical tear posterior horn medial  meniscus extending into the posterior meniscal body, cartilage intact.    5. Lateral compartment: No evidence of meniscal tear, cartilage intact.    6. Notch: ACL complete rupture, PCL intact.     INDICATIONS: The patient is a pleasant 44 y.o. female with significant history of buckling episode to the right knee with associated pain and swelling. MRI indicated a complete rupture of anterior cruciate ligament. Given buckling episodes as well as associated pain and the patient's desire to continue to participate in cutting and pivoting activities, patient wished to proceed with above-mentioned surgery.     INFORMED CONSENT: Patient was explained details of the procedure, as well as risks, benefits, and alternatives as documented on the History and Physical, and had all questions answered prior to signing the operative consent form. No guarantees were given in regard to results of the surgery.     DESCRIPTION OF PROCEDURE: The patient was seen, evaluated, and cleared for surgery by Anesthesia. Met in the preoperative holding area and the operative site marked, consent was reviewed, History and Physical was completed, and preoperative labs were reviewed. A regional block was then placed per Anesthesia. The patient was then taken to the operating room and placed in the supine position on a regular OR table. After successful intubation per Anesthesia examination under anesthesia was carried out at this point in time. A nonsterile tourniquet was applied to the right lower extremity. The right leg was placed in a leg tejada and the contralateral leg was placed in a stirrup. The patient was secured to the table with a waist strap. All bony prominences were well-padded. The right leg was then sterilely prepped and draped in a standard fashion.     Formal timeout was completed, including confirmation of History and Physical, operative consent, surgical site, patient identification number, and preoperative antibiotic  administration. The right leg was then exsanguinated and the tourniquet was inflated to 250 mmHg. The procedure was then begun with a standard 4 cm incision over the anteromedial face of the tibia through skin and subcutaneous tissue with a #15 blade. Blunt dissection was carried out through the subcutaneous and sartorial fascia layer to identify the gracilis and semitendinosus tendons at this time. Sartorial fascia was then elevated and distal attachment of both these tendons was released together. Gracilis and semitendinosus tendons were then isolated from the sartorial fascia and distal end was whipstitched with a running locking suture. Adhesions were removed under direct visualization with the Metzenbaum scissors. Tendon stripper was then used to harvest each of these tendons in a standard fashion. Tendons were then taken to the back table and muscular tissue was stripped from the tissue. Proximal end of the tendons was whipstitched with a running locking stitch and they were assembled over an adjustable loop Endobutton on the back table under 15 pounds of tension. Proximal end of the graft was whipstitched with a 0 Vicryl to tubularize the graft at this time. Moist saline-soaked gauze was then placed over the graft.     Attention was then returned to the knee were a standard anterolateral portal was made with a #11 blade followed by insertion of blunt trocar and a cannula. The scope was then inserted at this point in time. Standard anteromedial and far medial portal were then made with a spinal needle using outside-in technique. A probe was then inserted and diagnostic arthroscopic exam was carried out at this point in time with findings noted above.     Attention was then turned to medial meniscal repair. Tear site was identified in the posterior horn and body, ball tip rasp was used to debride the tear site. Fast fix meniscal repair device x 4 were passed in vertical mattress fashion, cinched down with  pusher, and cut short. Following repair, probe was re-inserted and meniscal tear noted to be well reduced and secure at this point.     Attention was then turned to anterior cruciate ligament reconstruction. Debridement of the fat pad as well as a small notchplasty and debridement of the remainder of the ACL stump was completed at this point in time with the shaver as well as ablation wand. The femoral attachment site on the lateral wall was identified at this time. The outside-in Kilgore and Nephew pinpoint guide was then placed. Graft had been sized as 8.5 mm and the 8 mm guide was used to at this time. A 3 cm incision was made over the lateral femoral condyle proximal to the epicondyle at this time through skin only. Trocar guide was advanced to the lateral cortex. Guide pin was fired into the knee joint and noted to be in acceptable position.  4.5 mm reamer was then passed over the guidepin with a curette protecting the tip of the guidepin. An 8.5 mm TruNav reamer was then passed in line over the guide pin at this time, bony debris was removed with irrigation and suction and the TruNav reamer was flipped into its extended position and locked into place. Length of the tunnel was then measured with the TruNav device.  Retrograde reaming was then completed, leaving a 10 mm lateral cortical wall. TruNav reamer was once again advanced into the notch, flipped into its in line position, and central pin was removed. A shaver was then used to chamfer smooth the edges of the tunnel as well as remove bony debris.  Shuttling suture was then passed through the reamer and retrieved through the lateral portal.  Reamer was then removed from the femoral tunnel at this time.    Attention was then turned to the tibial tunnel with the tibial guide set at 55°. Tibial guide was inserted through the far medial portal. The tip of the guide was placed to the anterior horn of the lateral meniscus and just lateral to the medial tibial  spine. Guide pin was fired into the joint at this point in time through the prior hamstring incision. A 6 mm reamer was used followed by 8.5 mm reamer in standard fashion creating the tibial tunnel. Edges of the tunnel were chamfered smooth with a shaver.     The passing suture was then retrieved through the distal end of the tibial tunnel at this time. Graft was then passed through mineral oil and brought to the operative site. Lead sutures from the adjustable loop endobutton were passed with a shuttling suture brought up through the lateral soft tissues.  Adjustable loop Endobutton was then pulled into the joint and out through the femoral tunnel until it was noted to be outside the lateral cortex and was successfully flipped at this time while staying below the IT band.  Using the adjustable loop of the ultra button, graft was then pulled into the notch and subsequently into the femoral tunnel until it was pulled up to the marked length of the femoral tunnel. Traction was pulled aggressively across the tibial side of the graft and there was noted to be appropriate fixation on the femoral side of the graft. The knee was then cycled from 0° to 130° 30 times to reduce creep in the graft.     Arthroscopic images were then taken with the knee in full extension with no evidence of anterior, medial, or lateral impingement noted.     Attention was then turned to tibial-sided fixation with the knee brought to 30° of flexion with traction pulled across the tibial side of the graft as well as a gentle posterior drawer. A flexible guidepin was placed followed by insertion of 8 x 25 mm Biosure interference screw into the tibial tunnel while traction was maintained across the graft distally and gentle posterior drawer was placed on the proximal tibia. Arthroscope was re-inserted, graft examined and noted to have good tension with a probe as well as on Lachman exam with direct visualization.  Secondary tibial fixation was then  obtained with Mitek screw being placed in bicortical fashion with extraneous graft and sutures tensioned around the screw and washer which served as a post for secondary tibial fixation.  Once graft and sutures were secured in place, extraneous graft and sutures were sharply excised with a knife.    The knee was then thoroughly irrigated with normal saline through the scope and with an open cannula.  Additional traction was then pulled across the adjustable loop from the ultra button and final tensioning was achieved of the graft.  Fluid was then evacuated from the knee with suction. Open incisions were thoroughly irrigated at this time with Betadine lavage as well as normal saline. Attention was then turned to closure of the wounds with subcutaneous layer closed with 2-0 Vicryl, and skin closure with 3-0 nylon and portal sites and 3-0 strata fix as well as Prineo mesh and glue at medial tibial incision. The wounds were dressed with Telfa, 4 x 4's, ABD pad, Webril, ACE wrap, ice pack, and a knee brace locked in extension.     At the end of the procedure all lap, needle, and sponge counts were correct x2. The patient had brisk capillary refill to all digits of the right lower extremity. Compartments were soft and easily compressible at the end of the procedure.     DISPOSITION: The patient was extubated per Anesthesia and taken to the recovery room in stable condition. Patient will be discharged home in the care of family and follow up in 1 week for wound check. Will start physical therapy on postoperative day 3 or 4, weight-bear as tolerated, and brace locked in extension when upright.  Aspirin 81 mg BID daily for DVT prophylaxis for 2 weeks.  I discussed results of the procedure as well as postoperative precautions with the family after the surgery and they had all questions answered at that time.

## 2024-12-12 NOTE — ANESTHESIA PROCEDURE NOTES
Peripheral Block    Pre-sedation assessment completed: 12/12/2024 12:03 PM    Patient reassessed immediately prior to procedure    Patient location during procedure: pre-op  Start time: 12/12/2024 12:14 PM  Stop time: 12/12/2024 12:17 PM  Reason for block: procedure for pain, at surgeon's request, post-op pain management and secondary anesthetic  Performed by  CRNA/CAA: Rula Tan CRNA  Preanesthetic Checklist  Completed: patient identified, IV checked, site marked, risks and benefits discussed, surgical consent, monitors and equipment checked, pre-op evaluation and timeout performed  Prep:  Pt Position: supine  Sterile barriers:cap, gloves, mask and washed/disinfected hands  Prep: ChloraPrep  Patient monitoring: blood pressure monitoring, continuous pulse oximetry and EKG  Procedure    Sedation: yes  Performed under: local infiltration  Guidance:ultrasound guided    ULTRASOUND INTERPRETATION.  Using ultrasound guidance a 21 G gauge needle was placed in close proximity to the nerve, at which point, under ultrasound guidance anesthetic was injected in the area of the nerve and spread of the anesthesia was seen on ultrasound in close proximity thereto.  There were no abnormalities seen on ultrasound; a digital image was taken; and the patient tolerated the procedure with no complications. Images:still images obtained, printed/placed on chart    Laterality:right  Block Type:adductor canal block  Injection Technique:catheter  Needle Type:echogenic  Needle Gauge:21 G  Resistance on Injection: none    Medications Used: bupivacaine PF (MARCAINE) injection 0.25% - Peripheral Nerve   10 mL - 12/12/2024 12:17:00 PM  dexmedetomidine HCl (PRECEDEX) injection - Perineural   20 mcg - 12/12/2024 12:17:00 PM  lidocaine PF 2% (XYLOCAINE) injection - Infiltration   3 mL - 12/12/2024 12:14:00 PM      Post Assessment  Injection Assessment: negative aspiration for heme, no paresthesia on injection and incremental  injection  Patient Tolerance:comfortable throughout block  Complications:no  Performed by: Rula Tan CRNA

## 2024-12-12 NOTE — ANESTHESIA PROCEDURE NOTES
Airway  Urgency: elective    Date/Time: 12/12/2024 1:08 PM  Airway not difficult    General Information and Staff    Patient location during procedure: OR  CRNA/CAA: Jennifer Macdonald CRNA    Indications and Patient Condition  Indications for airway management: airway protection    Preoxygenated: yes  Mask difficulty assessment: 0 - not attempted    Final Airway Details  Final airway type: endotracheal airway      Successful airway: ETT  Cuffed: yes   Successful intubation technique: direct laryngoscopy  Endotracheal tube insertion site: oral  Blade: Michael  Blade size: 3.5  ETT size (mm): 7.0  Cormack-Lehane Classification: grade I - full view of glottis  Placement verified by: chest auscultation and capnometry   Measured from: lips  ETT/EBT  to lips (cm): 21  Number of attempts at approach: 1  Assessment: lips, teeth, and gum same as pre-op and atraumatic intubation

## 2024-12-12 NOTE — ANESTHESIA PROCEDURE NOTES
Peripheral Block    Pre-sedation assessment completed: 12/12/2024 3:58 PM    Patient reassessed immediately prior to procedure    Patient location during procedure: post-op  Start time: 12/12/2024 4:14 PM  Stop time: 12/12/2024 4:20 PM  Reason for block: at surgeon's request and post-op pain management  Performed by  LENNY/CAA: Jennifer Macdonald CRNA  Preanesthetic Checklist  Completed: patient identified, IV checked, site marked, risks and benefits discussed, surgical consent, monitors and equipment checked, pre-op evaluation and timeout performed  Prep:  Pt Position: supine  Sterile barriers:cap, gloves, mask and washed/disinfected hands  Prep: ChloraPrep  Patient monitoring: blood pressure monitoring, continuous pulse oximetry and EKG  Procedure    Sedation: yes  Performed under: local infiltration  Guidance:ultrasound guided    ULTRASOUND INTERPRETATION.  Using ultrasound guidance a 21 G gauge needle was placed in close proximity to the nerve, at which point, under ultrasound guidance anesthetic was injected in the area of the nerve and spread of the anesthesia was seen on ultrasound in close proximity thereto.  There were no abnormalities seen on ultrasound; a digital image was taken; and the patient tolerated the procedure with no complications. Images:still images obtained, printed/placed on chart    Laterality:right  Block Type:adductor canal block  Injection Technique:catheter  Needle Type:echogenic  Needle Gauge:21 G  Resistance on Injection: none          Medications  Comment:Catheter was removed after induction and prior to incision due to placement of the catheter being too close to the tourniquet. Catheter was reinserted in PACU with only NS to flush the needle and indwelling catheter. Pain ball attached per RN notes.    Post Assessment  Injection Assessment: negative aspiration for heme, no paresthesia on injection and incremental injection  Patient Tolerance:comfortable throughout  block  Complications:no  Performed by: Jennifer Macdonald, CRNA

## 2024-12-13 ENCOUNTER — APPOINTMENT (OUTPATIENT)
Dept: CT IMAGING | Facility: HOSPITAL | Age: 44
End: 2024-12-13

## 2024-12-13 ENCOUNTER — HOSPITAL ENCOUNTER (EMERGENCY)
Facility: HOSPITAL | Age: 44
Discharge: HOME | End: 2024-12-13
Attending: STUDENT IN AN ORGANIZED HEALTH CARE EDUCATION/TRAINING PROGRAM

## 2024-12-13 ENCOUNTER — TELEPHONE (OUTPATIENT)
Dept: ORTHOPEDIC SURGERY | Facility: CLINIC | Age: 44
End: 2024-12-13

## 2024-12-13 VITALS
BODY MASS INDEX: 23.54 KG/M2 | RESPIRATION RATE: 24 BRPM | DIASTOLIC BLOOD PRESSURE: 72 MMHG | HEIGHT: 67 IN | TEMPERATURE: 98.8 F | OXYGEN SATURATION: 100 % | HEART RATE: 78 BPM | WEIGHT: 150 LBS | SYSTOLIC BLOOD PRESSURE: 117 MMHG

## 2024-12-13 DIAGNOSIS — I26.93 SINGLE SUBSEGMENTAL PULMONARY EMBOLISM WITHOUT ACUTE COR PULMONALE: Primary | ICD-10-CM

## 2024-12-13 LAB
ANION GAP SERPL CALCULATED.3IONS-SCNC: 12.7 MMOL/L (ref 5–15)
BASOPHILS # BLD AUTO: 0.06 10*3/MM3 (ref 0–0.2)
BASOPHILS NFR BLD AUTO: 0.4 % (ref 0–1.5)
BUN SERPL-MCNC: 13 MG/DL (ref 6–20)
BUN/CREAT SERPL: 16.9 (ref 7–25)
CALCIUM SPEC-SCNC: 8.4 MG/DL (ref 8.6–10.5)
CHLORIDE SERPL-SCNC: 103 MMOL/L (ref 98–107)
CO2 SERPL-SCNC: 22.3 MMOL/L (ref 22–29)
CREAT SERPL-MCNC: 0.77 MG/DL (ref 0.57–1)
DEPRECATED RDW RBC AUTO: 42.5 FL (ref 37–54)
EGFRCR SERPLBLD CKD-EPI 2021: 98.3 ML/MIN/1.73
EOSINOPHIL # BLD AUTO: 0.09 10*3/MM3 (ref 0–0.4)
EOSINOPHIL NFR BLD AUTO: 0.6 % (ref 0.3–6.2)
ERYTHROCYTE [DISTWIDTH] IN BLOOD BY AUTOMATED COUNT: 12.5 % (ref 12.3–15.4)
GLUCOSE SERPL-MCNC: 72 MG/DL (ref 65–99)
HCG SERPL QL: NEGATIVE
HCT VFR BLD AUTO: 35.9 % (ref 34–46.6)
HGB BLD-MCNC: 12 G/DL (ref 12–15.9)
IMM GRANULOCYTES # BLD AUTO: 0.04 10*3/MM3 (ref 0–0.05)
IMM GRANULOCYTES NFR BLD AUTO: 0.3 % (ref 0–0.5)
LYMPHOCYTES # BLD AUTO: 3.39 10*3/MM3 (ref 0.7–3.1)
LYMPHOCYTES NFR BLD AUTO: 24 % (ref 19.6–45.3)
MAGNESIUM SERPL-MCNC: 1.7 MG/DL (ref 1.6–2.6)
MCH RBC QN AUTO: 31.2 PG (ref 26.6–33)
MCHC RBC AUTO-ENTMCNC: 33.4 G/DL (ref 31.5–35.7)
MCV RBC AUTO: 93.2 FL (ref 79–97)
MONOCYTES # BLD AUTO: 1.04 10*3/MM3 (ref 0.1–0.9)
MONOCYTES NFR BLD AUTO: 7.4 % (ref 5–12)
NEUTROPHILS NFR BLD AUTO: 67.3 % (ref 42.7–76)
NEUTROPHILS NFR BLD AUTO: 9.48 10*3/MM3 (ref 1.7–7)
NRBC BLD AUTO-RTO: 0 /100 WBC (ref 0–0.2)
PLATELET # BLD AUTO: 236 10*3/MM3 (ref 140–450)
PMV BLD AUTO: 10.5 FL (ref 6–12)
POTASSIUM SERPL-SCNC: 3 MMOL/L (ref 3.5–5.2)
QT INTERVAL: 400 MS
QTC INTERVAL: 418 MS
RBC # BLD AUTO: 3.85 10*6/MM3 (ref 3.77–5.28)
SODIUM SERPL-SCNC: 138 MMOL/L (ref 136–145)
WBC NRBC COR # BLD AUTO: 14.1 10*3/MM3 (ref 3.4–10.8)

## 2024-12-13 PROCEDURE — 84703 CHORIONIC GONADOTROPIN ASSAY: CPT | Performed by: STUDENT IN AN ORGANIZED HEALTH CARE EDUCATION/TRAINING PROGRAM

## 2024-12-13 PROCEDURE — 96375 TX/PRO/DX INJ NEW DRUG ADDON: CPT

## 2024-12-13 PROCEDURE — 25010000002 ONDANSETRON PER 1 MG: Performed by: STUDENT IN AN ORGANIZED HEALTH CARE EDUCATION/TRAINING PROGRAM

## 2024-12-13 PROCEDURE — 83735 ASSAY OF MAGNESIUM: CPT | Performed by: STUDENT IN AN ORGANIZED HEALTH CARE EDUCATION/TRAINING PROGRAM

## 2024-12-13 PROCEDURE — 25510000001 IOPAMIDOL PER 1 ML: Performed by: STUDENT IN AN ORGANIZED HEALTH CARE EDUCATION/TRAINING PROGRAM

## 2024-12-13 PROCEDURE — 93005 ELECTROCARDIOGRAM TRACING: CPT | Performed by: STUDENT IN AN ORGANIZED HEALTH CARE EDUCATION/TRAINING PROGRAM

## 2024-12-13 PROCEDURE — 85025 COMPLETE CBC W/AUTO DIFF WBC: CPT | Performed by: STUDENT IN AN ORGANIZED HEALTH CARE EDUCATION/TRAINING PROGRAM

## 2024-12-13 PROCEDURE — 99285 EMERGENCY DEPT VISIT HI MDM: CPT | Performed by: STUDENT IN AN ORGANIZED HEALTH CARE EDUCATION/TRAINING PROGRAM

## 2024-12-13 PROCEDURE — 25010000002 LORAZEPAM PER 2 MG: Performed by: STUDENT IN AN ORGANIZED HEALTH CARE EDUCATION/TRAINING PROGRAM

## 2024-12-13 PROCEDURE — 71275 CT ANGIOGRAPHY CHEST: CPT

## 2024-12-13 PROCEDURE — 93010 ELECTROCARDIOGRAM REPORT: CPT | Performed by: INTERNAL MEDICINE

## 2024-12-13 PROCEDURE — 25010000002 MORPHINE PER 10 MG: Performed by: STUDENT IN AN ORGANIZED HEALTH CARE EDUCATION/TRAINING PROGRAM

## 2024-12-13 PROCEDURE — 96374 THER/PROPH/DIAG INJ IV PUSH: CPT

## 2024-12-13 PROCEDURE — 80048 BASIC METABOLIC PNL TOTAL CA: CPT | Performed by: STUDENT IN AN ORGANIZED HEALTH CARE EDUCATION/TRAINING PROGRAM

## 2024-12-13 RX ORDER — LORAZEPAM 2 MG/ML
1 INJECTION INTRAMUSCULAR ONCE
Status: COMPLETED | OUTPATIENT
Start: 2024-12-13 | End: 2024-12-13

## 2024-12-13 RX ORDER — ONDANSETRON 2 MG/ML
4 INJECTION INTRAMUSCULAR; INTRAVENOUS ONCE
Status: COMPLETED | OUTPATIENT
Start: 2024-12-13 | End: 2024-12-13

## 2024-12-13 RX ORDER — POTASSIUM CHLORIDE 1.5 G/1.58G
40 POWDER, FOR SOLUTION ORAL ONCE
Status: COMPLETED | OUTPATIENT
Start: 2024-12-13 | End: 2024-12-13

## 2024-12-13 RX ORDER — IOPAMIDOL 755 MG/ML
100 INJECTION, SOLUTION INTRAVASCULAR
Status: COMPLETED | OUTPATIENT
Start: 2024-12-13 | End: 2024-12-13

## 2024-12-13 RX ADMIN — IOPAMIDOL 100 ML: 755 INJECTION, SOLUTION INTRAVENOUS at 17:54

## 2024-12-13 RX ADMIN — APIXABAN 10 MG: 2.5 TABLET, FILM COATED ORAL at 18:45

## 2024-12-13 RX ADMIN — ONDANSETRON 4 MG: 2 INJECTION INTRAMUSCULAR; INTRAVENOUS at 17:06

## 2024-12-13 RX ADMIN — MORPHINE SULFATE 4 MG: 4 INJECTION, SOLUTION INTRAMUSCULAR; INTRAVENOUS at 17:06

## 2024-12-13 RX ADMIN — POTASSIUM CHLORIDE 40 MEQ: 1.5 POWDER, FOR SOLUTION ORAL at 18:45

## 2024-12-13 RX ADMIN — LORAZEPAM 1 MG: 2 INJECTION INTRAMUSCULAR; INTRAVENOUS at 17:06

## 2024-12-13 NOTE — ANESTHESIA POSTPROCEDURE EVALUATION
Patient: Becca Fonseca    Procedure Summary       Date: 12/12/24 Room / Location:  [facility] 2 /  [facility]    Anesthesia Start: 1259 Anesthesia Stop: 1632    Procedure: Knee arthroscopy, anterior cruciate ligament reconstruction with hamstring autograft, possible allograft supplement, meniscal and cartilage surgery as indicated (Right: Knee) Diagnosis:       Rupture of anterior cruciate ligament of right knee, initial encounter      Complex tear of medial meniscus of right knee as current injury, initial encounter      (Rupture of anterior cruciate ligament of right knee, initial encounter [S83.511A])      (Complex tear of medial meniscus of right knee as current injury, initial encounter [S83.231A])    Surgeons: David Mortensen MD Provider: Jennifer Macdonald CRNA    Anesthesia Type: general, regional, general with block ASA Status: 1            Anesthesia Type: general, regional, general with block    Vitals  Vitals Value Taken Time   /96 12/12/24 1640   Temp     Pulse 81 12/12/24 1642   Resp 16 12/12/24 1640   SpO2 99 % 12/12/24 1642   Vitals shown include unfiled device data.        Post Anesthesia Care and Evaluation    Patient location during evaluation: bedside  Patient participation: complete - patient participated  Level of consciousness: awake and alert  Pain score: 4  Pain management: adequate    Airway patency: patent  Anesthetic complications: No anesthetic complications  PONV Status: none  Cardiovascular status: acceptable  Respiratory status: acceptable  Hydration status: acceptable  No anesthesia care post op

## 2024-12-13 NOTE — DISCHARGE INSTRUCTIONS

## 2024-12-13 NOTE — TELEPHONE ENCOUNTER
Patient is s/p ACL repair on 12/12/2024.  States that she is experiencing SOB, dizziness and severe thirst.  She is still wearing her scopolamine patch which is contributing the her thirst and possibly her dizziness, but patient was advised to be evaluated in the ED ASAP.  She does sound out of breath on the phone.  Patient states that she is waiting on her  to come home.

## 2024-12-13 NOTE — ED PROVIDER NOTES
"Subjective   History of Present Illness  This is a pleasant 44-year-old female who presents with shortness of breath after she had an ACL reconstruction yesterday with Dr. Mortensen at this facility.  The patient states that she has been progressing well, she skipped her dose of Percocet today but she states that she has been feeling short of breath like she \"is tight and cannot get a deep breath.\"  The patient denies cough, nausea, vomiting, chest pressure, back pain or syncope.  This is never happened before.  She denies any radiating symptoms or pain      Review of Systems   Constitutional:  Negative for activity change, appetite change, diaphoresis and fatigue.   All other systems reviewed and are negative.      Past Medical History:   Diagnosis Date    PONV (postoperative nausea and vomiting)        No Known Allergies    Past Surgical History:   Procedure Laterality Date    KNEE ACL RECONSTRUCTION Right 12/12/2024    Procedure: Knee arthroscopy, anterior cruciate ligament reconstruction with hamstring autograft, possible allograft supplement, meniscal and cartilage surgery as indicated;  Surgeon: David Mortensen MD;  Location: [facility];  Service: Orthopedics;  Laterality: Right;    TUBAL ABDOMINAL LIGATION  2007       History reviewed. No pertinent family history.    Social History     Socioeconomic History    Marital status:    Tobacco Use    Smoking status: Every Day     Current packs/day: 0.75     Average packs/day: 0.7 packs/day for 3.9 years (3.0 ttl pk-yrs)     Types: Cigarettes     Start date: 2021     Passive exposure: Current    Smokeless tobacco: Never   Vaping Use    Vaping status: Never Used   Substance and Sexual Activity    Alcohol use: Yes     Comment: 1    Drug use: Never    Sexual activity: Defer           Objective   Physical Exam  Vitals and nursing note reviewed.   Constitutional:       General: She is not in acute distress.     Appearance: Normal appearance. She is not " ill-appearing, toxic-appearing or diaphoretic.   HENT:      Head: Normocephalic and atraumatic.      Right Ear: Tympanic membrane and external ear normal.      Left Ear: Tympanic membrane and external ear normal.      Nose: Nose normal. No congestion or rhinorrhea.      Mouth/Throat:      Mouth: Mucous membranes are moist.      Pharynx: No oropharyngeal exudate or posterior oropharyngeal erythema.   Eyes:      Conjunctiva/sclera: Conjunctivae normal.      Pupils: Pupils are equal, round, and reactive to light.   Cardiovascular:      Rate and Rhythm: Normal rate and regular rhythm.      Pulses: Normal pulses.   Pulmonary:      Effort: Pulmonary effort is normal. No respiratory distress.      Breath sounds: Normal breath sounds. No stridor. No wheezing, rhonchi or rales.      Comments: SpO2 is 100% on room air.  Chest:      Chest wall: No tenderness.   Abdominal:      General: There is no distension.      Palpations: Abdomen is soft. There is no mass.      Tenderness: There is no guarding or rebound.   Musculoskeletal:         General: No swelling or deformity. Normal range of motion.      Cervical back: Normal range of motion and neck supple. No rigidity or tenderness.   Skin:     General: Skin is warm.      Capillary Refill: Capillary refill takes less than 2 seconds.      Coloration: Skin is not pale.   Neurological:      General: No focal deficit present.      Mental Status: She is alert and oriented to person, place, and time. Mental status is at baseline.   Psychiatric:         Mood and Affect: Mood normal.         Thought Content: Thought content normal.         Procedures           ED Course  ED Course as of 12/13/24 1838   Fri Dec 13, 2024   1654 ECG 12 Lead Dyspnea  Rate: 66, rhythm is regular, borderline left axis, low voltage in the inferior leads, sinus rhythm with no ST elevation or depression.  No evidence of right heart strain.  QTc 418. [JN]      ED Course User Index  [JN] Jimmy Maldonado DO                                                        Medical Decision Making    MDM:    Escalation of care including admission/observation considered    - Discussions of management with other providers:  None    - Discussed/reviewed with Radiology regarding test interpretation    - Independent interpretation: Labs, CT, and EKG    - Additional patient history obtained from: [facility]    - Review of external non-ED record (if available):  Prior Inpt record, Office record, Outpt record, Prior Outpt labs, PCP record, Outside ED record, Other    - Chronic conditions affecting care: See HPI and medical Hx.    - Social Determinants of health significantly affecting care:  None        Medical Decision Making Discussion:    44-year-old female presents 1 day after ACL repair.  Patient is experiencing some shortness of breath however she has no tachycardia or hypoxia.  She is well-appearing and has stable vitals.  Despite this CT scan is obtained to rule out PE status post ACL repair.  The patient has a right lower lobe segmental nonocclusive pulmonary embolus but no evidence of right heart strain.  No additional pulmonary emboli are noted.  There is no evidence of other abnormality.  Her vitals are stable.  I discussed with her orthopedic physician, Dr. Mortensen and he is okay with us anticoagulant.  Patient will receive the first dose of Eliquis tonight and then I will give a prescription for such.  She is instructed to follow-up with her PCP this next week to reevaluate her Eliquis and she will return with any acute change, worsening or bleeding.  The patient is otherwise well-appearing, vitals remained stable and I see no evidence of need for admission to the hospital at this time as she has no significant clot burden and this appears overall low risk.  Using Hestia, the patient has 0 points on her scale rating of low risk and eligible for outpatient treatment.  Patient is stable at this time for discharge and will follow-up  outpatient as already indicated.    The patient has been given very strict return precautions to return to the emergency department should there be any acute change or worsening of their condition.  I have explained my findings and the patient has expressed understanding to me.  I explained that the work-up performed in the ED has been based on the specific complaint and concern, as the nature of emergency medicine is complaint driven and they understand that new symptoms may arise.  I have told them that, should there be any new symptoms, worsening or changing symptoms, a new work-up may be indicated that they are encouraged to return to the emergency department or promptly contact their primary care physician. We have employed a shared decision-making process as the discussion of their disposition.  The patient has been educated as to the nature of the visit, the tests and work-up performed and the findings from today's visit. At this time, there does not appear to be any acute emergent process that necessitates admission to the hospital, however, the patient understands that this can change unexpectedly. At this time, the patient is stable for discharge home and agrees to follow-up with her primary care physician in the next 24 to 48 hours or earlier should they be able to obtain an appointment.    The patient was counseled regarding diagnostic results and treatment plan and patient has indicated understanding of these instructions.      Problems Addressed:  Single subsegmental pulmonary embolism without acute cor pulmonale: complicated acute illness or injury    Amount and/or Complexity of Data Reviewed  Labs: ordered.  Radiology: ordered.  ECG/medicine tests: ordered. Decision-making details documented in ED Course.    Risk  Prescription drug management.        Final diagnoses:   Single subsegmental pulmonary embolism without acute cor pulmonale       ED Disposition  ED Disposition       ED Disposition    Discharge    Condition   Good    Comment   --               David Mrotensen MD  [address]  [address]  [place] [id number] [phone]    In 3 days      [facility] EMERGENCY DEPARTMENT  [address]  [place] [address] [phone]    As needed, If symptoms worsen, or any new symptoms develop    PATIENT CONNECTION - [facility] [place] [id number] [phone]  In 3 days  or follow up with your PCP         Medication List        New Prescriptions      Apixaban Starter Pack tablet therapy pack  Take two 5 mg tablets by mouth every 12 hours for 7 days. Followed by one 5 mg tablet every 12 hours. (Dispense starter pack if available)               Where to Get Your Medications        These medications were sent to [id number] - [place], [place] - [address] - [phone]  - [phone]  [address], [facility] [address]      Phone: [phone]   Apixaban Starter Pack tablet therapy pack            Jimmy Maldonado, DO  12/13/24 1838       Jimmy Maldonado, DO  12/13/24 1910

## 2024-12-16 ENCOUNTER — APPOINTMENT (OUTPATIENT)
Dept: GENERAL RADIOLOGY | Facility: HOSPITAL | Age: 44
End: 2024-12-16

## 2024-12-16 ENCOUNTER — HOSPITAL ENCOUNTER (OUTPATIENT)
Dept: PHYSICAL THERAPY | Facility: HOSPITAL | Age: 44
Setting detail: THERAPIES SERIES
Discharge: HOME | End: 2024-12-16

## 2024-12-16 ENCOUNTER — HOSPITAL ENCOUNTER (EMERGENCY)
Facility: HOSPITAL | Age: 44
Discharge: HOME | End: 2024-12-16
Attending: STUDENT IN AN ORGANIZED HEALTH CARE EDUCATION/TRAINING PROGRAM | Admitting: STUDENT IN AN ORGANIZED HEALTH CARE EDUCATION/TRAINING PROGRAM

## 2024-12-16 VITALS
HEIGHT: 67 IN | SYSTOLIC BLOOD PRESSURE: 99 MMHG | OXYGEN SATURATION: 99 % | DIASTOLIC BLOOD PRESSURE: 66 MMHG | TEMPERATURE: 97.7 F | RESPIRATION RATE: 20 BRPM | WEIGHT: 151 LBS | BODY MASS INDEX: 23.7 KG/M2 | HEART RATE: 67 BPM

## 2024-12-16 DIAGNOSIS — S83.511A RUPTURE OF ANTERIOR CRUCIATE LIGAMENT OF RIGHT KNEE, INITIAL ENCOUNTER: Primary | ICD-10-CM

## 2024-12-16 DIAGNOSIS — R06.00 DYSPNEA, UNSPECIFIED TYPE: ICD-10-CM

## 2024-12-16 DIAGNOSIS — I26.93 SINGLE SUBSEGMENTAL PULMONARY EMBOLISM WITHOUT ACUTE COR PULMONALE: ICD-10-CM

## 2024-12-16 DIAGNOSIS — F41.0 PANIC ATTACK: Primary | ICD-10-CM

## 2024-12-16 DIAGNOSIS — S83.231A COMPLEX TEAR OF MEDIAL MENISCUS OF RIGHT KNEE AS CURRENT INJURY, INITIAL ENCOUNTER: ICD-10-CM

## 2024-12-16 DIAGNOSIS — R55 NEAR SYNCOPE: ICD-10-CM

## 2024-12-16 DIAGNOSIS — Z98.890 HISTORY OF KNEE SURGERY: ICD-10-CM

## 2024-12-16 LAB
GLUCOSE BLDC GLUCOMTR-MCNC: 92 MG/DL (ref 70–130)
QT INTERVAL: 429 MS
QTC INTERVAL: 450 MS

## 2024-12-16 PROCEDURE — 93005 ELECTROCARDIOGRAM TRACING: CPT | Performed by: STUDENT IN AN ORGANIZED HEALTH CARE EDUCATION/TRAINING PROGRAM

## 2024-12-16 PROCEDURE — 82948 REAGENT STRIP/BLOOD GLUCOSE: CPT

## 2024-12-16 PROCEDURE — 71045 X-RAY EXAM CHEST 1 VIEW: CPT

## 2024-12-16 PROCEDURE — 99283 EMERGENCY DEPT VISIT LOW MDM: CPT | Performed by: STUDENT IN AN ORGANIZED HEALTH CARE EDUCATION/TRAINING PROGRAM

## 2024-12-16 RX ORDER — ALPRAZOLAM 0.25 MG/1
0.5 TABLET ORAL ONCE
Status: DISCONTINUED | OUTPATIENT
Start: 2024-12-16 | End: 2024-12-16

## 2024-12-16 RX ORDER — ALPRAZOLAM 0.25 MG/1
0.25 TABLET ORAL ONCE
Status: COMPLETED | OUTPATIENT
Start: 2024-12-16 | End: 2024-12-16

## 2024-12-16 RX ORDER — OXYCODONE AND ACETAMINOPHEN 5; 325 MG/1; MG/1
1 TABLET ORAL ONCE
Status: COMPLETED | OUTPATIENT
Start: 2024-12-16 | End: 2024-12-16

## 2024-12-16 RX ADMIN — OXYCODONE HYDROCHLORIDE AND ACETAMINOPHEN 1 TABLET: 5; 325 TABLET ORAL at 08:24

## 2024-12-16 RX ADMIN — ALPRAZOLAM 0.25 MG: 0.25 TABLET ORAL at 08:24

## 2024-12-16 NOTE — ED NOTES
"Pt reports \"vibrating all over my body\" with subjective weakness to BUEs. Also reports feeling \"very short of breath, like I can't catch my breath\". Provider at bedside.  "

## 2024-12-16 NOTE — ED PROVIDER NOTES
Jaye   Is a 44-year-old female with no sniffing past medical history but is a current everyday smoker presenting with complaint that she had near syncopal episode while she was coming into the hospital for physical therapy secondary to having ACL surgery on her right knee on Friday.  She was found to have a pulmonary embolism started on Eliquis starter pack and is medically compliant and had a segmental right lower lobe nonocclusive emboli.  No signs of heart strain, she reports that she has been fine until this morning when she felt lightheaded as she was going to physical therapy, she had bilateral arm tingling, she also felt like she had lightheadedness, she did not pass out she slowly lowered herself to the floor.  She was breathing fast but otherwise she notes no other symptoms including no chest pain no shortness of breath no increased or new right lower extremity swelling    Review of Systems    Past Medical History:   Diagnosis Date    PONV (postoperative nausea and vomiting)        No Known Allergies    Past Surgical History:   Procedure Laterality Date    KNEE ACL RECONSTRUCTION Right 12/12/2024    Procedure: Knee arthroscopy, anterior cruciate ligament reconstruction with hamstring autograft, possible allograft supplement, meniscal and cartilage surgery as indicated;  Surgeon: David Mortensen MD;  Location: [facility];  Service: Orthopedics;  Laterality: Right;    TUBAL ABDOMINAL LIGATION  2007       History reviewed. No pertinent family history.    Social History     Socioeconomic History    Marital status:    Tobacco Use    Smoking status: Every Day     Current packs/day: 0.75     Average packs/day: 0.8 packs/day for 4.0 years (3.0 ttl pk-yrs)     Types: Cigarettes     Start date: 2021     Passive exposure: Current    Smokeless tobacco: Never   Vaping Use    Vaping status: Never Used   Substance and Sexual Activity    Alcohol use: Yes     Comment: 1    Drug use: Never    Sexual activity:  Defer           Objective   Physical Exam  Vitals and nursing note reviewed. Exam conducted with a chaperone present.   Constitutional:       General: She is not in acute distress.     Appearance: Normal appearance. She is not ill-appearing, toxic-appearing or diaphoretic.      Comments: Anxious appearing   HENT:      Head: Normocephalic and atraumatic.      Nose: Nose normal.      Mouth/Throat:      Mouth: Mucous membranes are moist.      Pharynx: Oropharynx is clear. No oropharyngeal exudate or posterior oropharyngeal erythema.      Comments: No oral airway swelling no signs of angioedema  Eyes:      Extraocular Movements: Extraocular movements intact.      Conjunctiva/sclera: Conjunctivae normal.      Pupils: Pupils are equal, round, and reactive to light.   Cardiovascular:      Rate and Rhythm: Normal rate and regular rhythm.   Pulmonary:      Effort: Pulmonary effort is normal. No respiratory distress.      Breath sounds: Normal breath sounds. No stridor. No wheezing, rhonchi or rales.      Comments: Tachypneic  Chest:      Chest wall: No tenderness.   Abdominal:      General: Abdomen is flat. There is no distension.      Tenderness: There is no abdominal tenderness. There is no guarding or rebound.   Musculoskeletal:         General: No swelling, tenderness, deformity or signs of injury. Normal range of motion.      Cervical back: Normal range of motion. No rigidity or tenderness.   Skin:     General: Skin is warm and dry.      Capillary Refill: Capillary refill takes less than 2 seconds.      Findings: No erythema or rash.   Neurological:      General: No focal deficit present.      Mental Status: She is alert and oriented to person, place, and time. Mental status is at baseline.      Cranial Nerves: No cranial nerve deficit.      Sensory: No sensory deficit.      Motor: No weakness.   Psychiatric:         Mood and Affect: Mood normal.         Procedures           ED Course  ED Course as of 12/16/24 6903    Mon Dec 16, 2024   0823 Oxycodone for patient's scheduled 8 AM dose of postsurgical pain med [AK]   0856 Unremarkable, patient's lightheadedness has improved however she still feels like she is short of breath, she remains under percent on room air.  She has improving tachypnea.  Will perform an ambulatory test with the patient's walker which she uses since the surgery and check for desaturation otherwise no signs of respiratory distress no  muscle usage she is able to speak in full sentences.     Clarification of the  regarding patient's lightheadedness, he states it is mainly after she takes her pain medicine, she has not been eating well, counseled them for signs of dehydration and encouraged hydration and food intake.  Patient does not endorse nausea [AK]   0908 EKG read, nonischemic, rate of 66, intervals are normal, sinus rhythm, no signs of acute ischemia [AK]   0914 Patient passed post ambulation test, no desat, she was tachypneic but this is consistent with her anxiety as well as having a pulmonary embolus.  Had an extensive discussion and through shared decision making and agree that the preferred management is at home with continued blood thinners and monitoring.  Also had an extended discussion regarding return precautions given her history of a pulmonary embolism.  She is a reliable patient and she has good social support with her  at bedside.  Encourage hydration as well as p.o. intake.  [AK]      ED Course User Index  [AK] Luiz Clarke MD                                                       Medical Decision Making  History and physical remarkable for a young female here status post ACL reconstructive surgery and diagnosed with a nonocclusive right lower lobe emboli currently on Eliquis medically compliant presenting with near syncopal episode.  This is likely secondary to anxiety as she had classic symptoms of a panic attack.  Get an EKG and a chest x-ray to rule out  alternative diagnoses but patient's vital signs are reassuring, she was tachypneic likely secondary to panic attack, will give her Xanax to treat a panic attack and monitor for improvement of symptoms.  No signs of respiratory distress or hemodynamic compromise concerning for an enlarging pulmonary embolism    Amount and/or Complexity of Data Reviewed  Radiology: ordered.  ECG/medicine tests: ordered.    Risk  Prescription drug management.        Final diagnoses:   Single subsegmental pulmonary embolism without acute cor pulmonale   History of knee surgery   Dyspnea, unspecified type   Near syncope   Panic attack       ED Disposition  ED Disposition       ED Disposition   Discharge    Condition   Stable    Comment   --               [facility] EMERGENCY DEPARTMENT  [address] [address] [phone]  Go to   If symptoms worsen-including worsening shortness of breath, breathing fast while at rest, any episodes of passing out or if you are unable to perform your activities of daily living because you are too short of breath    Please follow-up with your orthopedic surgeon as scheduled for your postoperative appointment               Medication List      No changes were made to your prescriptions during this visit.            Luiz Clarke MD  12/16/24 7275       Luiz Clarke MD  12/16/24 9323

## 2024-12-16 NOTE — Clinical Note
BRITTNEY MARCELINO  [facility] EMERGENCY DEPARTMENT  [address] [place] [address]  Phone: [phone]    Becca Fonseca was seen and treated in our emergency department on 12/16/2024.  She may return to work on 12/18/2024.         Thank you for choosing [facility].    Luiz Clarke MD

## 2024-12-16 NOTE — DISCHARGE INSTRUCTIONS
Please continue to take your Eliquis and if you are unable to take your medicine please return to the emergency room.  It is very important after surgery when you are not moving around as much to take your blood thinner because you have a small blood clot in your lungs which is primarily causing your difficulty breathing.  You can purchase a pulse oximeter to measure your oxygen saturation in your lungs at home.  If it is low, below 92% you should return to the emergency room and you may need to be admitted to the hospital, you can also measure blood pressure at home and if your blood pressure is lower than 100/60 during normal awake hours you should return to the emergency room   [Appropriately responsive] : appropriately responsive [Alert] : alert [No Acute Distress] : no acute distress [No Lymphadenopathy] : no lymphadenopathy [Regular Rate Rhythm] : regular rate rhythm [No Murmurs] : no murmurs [Clear to Auscultation B/L] : clear to auscultation bilaterally [Soft] : soft [Non-tender] : non-tender [No HSM] : No HSM [Non-distended] : non-distended [No Lesions] : no lesions [No Mass] : no mass [Oriented x3] : oriented x3 [Examination Of The Breasts] : a normal appearance [No Masses] : no breast masses were palpable [Labia Majora] : normal [Labia Minora] : normal [IUD String] : an IUD string was noted [Normal] : normal [Uterine Adnexae] : normal

## 2024-12-17 ENCOUNTER — HOSPITAL ENCOUNTER (OUTPATIENT)
Dept: PHYSICAL THERAPY | Facility: HOSPITAL | Age: 44
Setting detail: THERAPIES SERIES
Discharge: HOME | End: 2024-12-17

## 2024-12-17 DIAGNOSIS — S83.231A COMPLEX TEAR OF MEDIAL MENISCUS OF RIGHT KNEE AS CURRENT INJURY, INITIAL ENCOUNTER: ICD-10-CM

## 2024-12-17 DIAGNOSIS — S83.511A RUPTURE OF ANTERIOR CRUCIATE LIGAMENT OF RIGHT KNEE, INITIAL ENCOUNTER: Primary | ICD-10-CM

## 2024-12-17 PROCEDURE — 97161 PT EVAL LOW COMPLEX 20 MIN: CPT

## 2024-12-17 NOTE — THERAPY EVALUATION
Outpatient Physical Therapy Ortho Initial Evaluation   [place]     Patient Name: Becca Fonseca  : 1980  MRN: [id number]  Today's Date: 2024      Visit Date: 2024    Patient Active Problem List   Diagnosis    Rupture of anterior cruciate ligament of right knee    Complex tear of medial meniscus of right knee as current injury        Past Medical History:   Diagnosis Date    PONV (postoperative nausea and vomiting)         Past Surgical History:   Procedure Laterality Date    KNEE ACL RECONSTRUCTION Right 2024    Procedure: Knee arthroscopy, anterior cruciate ligament reconstruction with hamstring autograft, possible allograft supplement, meniscal and cartilage surgery as indicated;  Surgeon: David Mortensen MD;  Location: [facility];  Service: Orthopedics;  Laterality: Right;    TUBAL ABDOMINAL LIGATION         Visit Dx:     ICD-10-CM ICD-9-CM   1. Rupture of anterior cruciate ligament of right knee, initial encounter  S83.511A 844.2   2. Complex tear of medial meniscus of right knee as current injury, initial encounter  S83.231A 836.0              PT Ortho       Row Name 24 1000       Precautions and Contraindications    Precautions/Limitations no known precautions/limitations  -AS       Subjective Pain    Pre-Treatment Pain Level 3  -AS    Post-Treatment Pain Level 3  -AS       Posture/Observations    Posture- WNL Posture is WNL  -AS    Observations Ecchymosis/bruising;Edema;Incision healing;Muscle atrophy  -AS       Patellar Accessory Motions    Superior glide Right:;Hypermobile  -AS    Inferior glide Right:;Hypermobile  -AS    Medial glide Right:;Hypermobile  -AS    Lateral glide Right:;Hypermobile  -AS       Right Lower Ext    Rt Knee Extension/Flexion AROM 0-69 degrees post stretch  0-2-45 degrees pre stretch  -AS       MMT Right Lower Ext    Rt Hip Flexion MMT, Gross Movement (3/5) fair  -AS    Rt Hip Extension MMT, Gross Movement (3+/5) fair plus  -AS    Rt Hip  ABduction MMT, Gross Movement (3+/5) fair plus  -AS    Rt Knee Extension MMT, Gross Movement (3/5) fair  -AS    Rt Knee Flexion MMT, Gross Movement (3/5) fair  -AS       Sensation    Sensation WNL? WNL  -AS    Light Touch No apparent deficits  -AS       Lower Extremity Flexibility    Hamstrings Right:;Mildly limited  -AS       Gait/Stairs (Locomotion)    Comment, (Gait/Stairs) Patient ambulates with a FWW at this time. Patient has decreased heel strike with shortened step and stride length on right side.  -AS              User Key  (r) = Recorded By, (t) = Taken By, (c) = Cosigned By      Initials Name Provider Type    AS Sheldon Helms, PT Physical Therapist                                       PT OP Goals       Row Name 12/17/24 1000          PT Short Term Goals    STG Date to Achieve 01/14/25  -AS     STG 1 Patient to demonstrate compliance with initial HEP for flexibility, ROM and strengthening.  -AS     STG 2 Patient to report right knee pain on VAS of 2-3/10 at worst with activity.  -AS     STG 3 Patient to demonstrate improved right LE strength to 4/5 in all planes.  -AS     STG 4 Patient to demonstrate improved right knee ROM to 0-1-110 degrees.  -AS     STG 5 Patient to ambulate short distances without the use of an AD and with an improved gait pattern.  -AS        Long Term Goals    LTG Date to Achieve 03/11/25  -AS     LTG 1 Patient to demonstrate compliance with advanced HEP for flexibility, ROM and strengthening.  -AS     LTG 2 Patient to report right knee pain on VAS of 0-1/10 at worst with activity.  -AS     LTG 3 Patient to demonstrate improved right LE strength to 4+/5 in all planes.  -AS     LTG 4 Patient to demonstrate improved right knee ROM to 0-130 degrees.  -AS     LTG 5 Patient to ambulate community distances without an AD and with a normal gait pattern.  -AS     LTG 6 Patient to report overall improved function on LEFS to 70/80.  -AS        Time Calculation    PT Goal Re-Cert Due  Date 01/14/25  -AS               User Key  (r) = Recorded By, (t) = Taken By, (c) = Cosigned By      Initials Name Provider Type    AS Sheldon Helms, PT Physical Therapist                     PT Assessment/Plan       Row Name 12/17/24 1000          PT Assessment    Functional Limitations Impaired gait;Impaired locomotion;Limitations in community activities;Limitation in home management;Performance in leisure activities;Performance in self-care ADL;Performance in work activities;Performance in sport activities  -AS     Impairments Impaired aerobic capacity;Impaired flexibility;Muscle strength;Pain;Range of motion  -AS     Assessment Comments Becca Fonseca is a 44 y.o. female who presents to outpatient PT s/p right ACL reconstruction. Surgery was performed on 12-12-24 by Dr. Mortensen. Patient was D/C home the same day with referral for outpatient PT. Patient is WBAT to RLE with brace locked in extension when upright and is ambulating with a FWW at this time. Patient states pain is well controlled with the use of ice and medication. Patient has limited left knee ROM, limited left LE strength, and increased left knee pain and edema with activity. Patient has limited function at this time secondary to the above.  -AS     Please refer to paper survey for additional self-reported information Yes  -AS     Rehab Potential Good  -AS     Patient/caregiver participated in establishment of treatment plan and goals Yes  -AS     Patient would benefit from skilled therapy intervention Yes  -AS        PT Plan    PT Frequency 2x/week  -AS     Predicted Duration of Therapy Intervention (PT) 8-10 weeks  -AS     Planned CPT's? PT RE-EVAL: 55744;PT THER PROC EA 15 MIN: 47762;PT THER ACT EA 15 MIN: 29282;PT MANUAL THERAPY EA 15 MIN: 48396;PT NEUROMUSC RE-EDUCATION EA 15 MIN: 27882;PT GAIT TRAINING EA 15 MIN: 75780  -AS               User Key  (r) = Recorded By, (t) = Taken By, (c) = Cosigned By      Initials Name Provider Type     AS Sheldon Helms, PT Physical Therapist                       OP Exercises       Row Name 12/17/24 1000             Subjective Pain    Able to rate subjective pain? yes  -AS      Pre-Treatment Pain Level 3  -AS      Post-Treatment Pain Level 3  -AS         Exercise 1    Exercise Name 1 Heel Slides  -AS      Time 1 8 min  -AS         Exercise 2    Exercise Name 2 Wall Slides  -AS         Exercise 3    Exercise Name 3 Bike  -AS         Exercise 4    Exercise Name 4 HS Stretch  -AS      Reps 4 10  -AS      Time 4 10 sec hold each  -AS         Exercise 5    Exercise Name 5 Heel Prop  -AS      Time 5 5 min  -AS         Exercise 6    Exercise Name 6 QS vs Nepalese  -AS      Time 6 10 min, 10/10 co-contract  -AS         Exercise 7    Exercise Name 7 SLR  -AS      Reps 7 25  -AS      Time 7 with assist  -AS         Exercise 8    Exercise Name 8 Ankle PF vs Band  -AS      Reps 8 25  -AS      Time 8 Gold  -AS                User Key  (r) = Recorded By, (t) = Taken By, (c) = Cosigned By      Initials Name Provider Type    AS Sheldon Helms, PT Physical Therapist                                  Outcome Measure Options: Lower Extremity Functional Scale (LEFS)  Lower Extremity Functional Index  Any of your usual work, housework or school activities: Quite a bit of difficulty  Your usual hobbies, recreational or sporting activities: Quite a bit of difficulty  Getting into or out of the bath: Quite a bit of difficulty  Walking between rooms: Moderate difficulty  Putting on your shoes or socks: Moderate difficulty  Squatting: Extreme difficulty or unable to perform activity  Lifting an object, like a bag of groceries from the floor: Quite a bit of difficulty  Performing light activities around your home: Quite a bit of difficulty  Performing heavy activities around your home: Extreme difficulty or unable to perform activity  Getting into or out of a car: Quite a bit of difficulty  Walking 2 blocks: Extreme difficulty  or unable to perform activity  Walking a mile: Extreme difficulty or unable to perform activity  Going up or down 10 stairs (about 1 flight of stairs): Extreme difficulty or unable to perform activity  Standing for 1 hour: Extreme difficulty or unable to perform activity  Sitting for 1 hour: No difficulty  Running on even ground: Extreme difficulty or unable to perform activity  Running on uneven ground: Extreme difficulty or unable to perform activity  Making sharp turns while running fast: Extreme difficulty or unable to perform activity  Hopping: Extreme difficulty or unable to perform activity  Rolling over in bed: Moderate difficulty  Total: 16  Lower Extremity Functional Index  Any of your usual work, housework or school activities: Quite a bit of difficulty  Your usual hobbies, recreational or sporting activities: Quite a bit of difficulty  Getting into or out of the bath: Quite a bit of difficulty  Walking between rooms: Moderate difficulty  Putting on your shoes or socks: Moderate difficulty  Squatting: Extreme difficulty or unable to perform activity  Lifting an object, like a bag of groceries from the floor: Quite a bit of difficulty  Performing light activities around your home: Quite a bit of difficulty  Performing heavy activities around your home: Extreme difficulty or unable to perform activity  Getting into or out of a car: Quite a bit of difficulty  Walking 2 blocks: Extreme difficulty or unable to perform activity  Walking a mile: Extreme difficulty or unable to perform activity  Going up or down 10 stairs (about 1 flight of stairs): Extreme difficulty or unable to perform activity  Standing for 1 hour: Extreme difficulty or unable to perform activity  Sitting for 1 hour: No difficulty  Running on even ground: Extreme difficulty or unable to perform activity  Running on uneven ground: Extreme difficulty or unable to perform activity  Making sharp turns while running fast: Extreme difficulty or  unable to perform activity  Hopping: Extreme difficulty or unable to perform activity  Rolling over in bed: Moderate difficulty  Total: 16      Time Calculation:     Start Time: 1030  Stop Time: 1127  Time Calculation (min): 57 min     Therapy Charges for Today       Code Description Service Date Service Provider Modifiers Qty    62459610650 HC PT EVAL LOW COMPLEXITY 4 12/17/2024 Sheldon Helms, PT GP 1            PT G-Codes  Outcome Measure Options: Lower Extremity Functional Scale (LEFS)  Total: 16         Sheldon Helms, PT  12/17/2024

## 2024-12-19 ENCOUNTER — OFFICE VISIT (OUTPATIENT)
Dept: ORTHOPEDIC SURGERY | Facility: CLINIC | Age: 44
End: 2024-12-19

## 2024-12-19 VITALS — BODY MASS INDEX: 23.7 KG/M2 | HEIGHT: 67 IN | WEIGHT: 151 LBS

## 2024-12-19 DIAGNOSIS — Z76.89 ENCOUNTER TO ESTABLISH CARE: Primary | ICD-10-CM

## 2024-12-19 DIAGNOSIS — Z98.890 STATUS POST ANTERIOR CRUCIATE LIGAMENT SURGERY: ICD-10-CM

## 2024-12-19 RX ORDER — HYDROCODONE BITARTRATE AND ACETAMINOPHEN 5; 325 MG/1; MG/1
1 TABLET ORAL EVERY 6 HOURS PRN
Qty: 30 TABLET | Refills: 0 | Status: SHIPPED | OUTPATIENT
Start: 2024-12-19

## 2024-12-19 NOTE — PROGRESS NOTES
CC:Status post right knee ACL reconstruction with hamstring autograft, medial meniscal repair, DOS 12/12/2024    Interval history: Patient returns to clinic today, 1 week from surgery, improving with physical therapy in regards to strengthening, range of motion, and ambulation.  She does report some constipation due to the pain medication therefore started to reduce the dose of the pain medication.  Denies any locking, catching, or giving way of right knee.  Has continued to wear the brace as instructed.  Denies any numbness, tingling, or issues with incisions over the knee.    Physical exam:            Right knee:   Incisions- clean dry, and intact, healing well   Effusion  mild   ROM- 0- 69 degrees   Strength-  4/5   Positive sensation light touch    Brisk cap refill   No calf pain, negative Tracie's sign bilateral lower extremities    Impression: Status post right knee ACL reconstruction, medial meniscal repair    Plan:  1.  Continue with physical therapy 3 times per week for work on range of motion and strengthening.  Discussed with patient and spouse to work progressively this weekend on range of motion goal by Monday is to progress to flexion at 90 degrees.  We did discuss the risk of stiffness if not progressing with physical therapy.  We did discontinue the oxycodone we will start Norco.  I do recommend MiraLAX 3 capfuls once daily until her bowels are moving.  We did discuss not continuing the ibuprofen if she is taking the Eliquis due to risk of increased bleeding.  We will proceed with referral for primary care to establish care for ongoing treatment for the Eliquis related to the pulmonary embolism.  We did adjust the hinged knee brace brace locked at 0 degrees while upright and walking, can unlock the brace which is set at 90 degrees now for goal when she is completing her exercises she can also complete exercises outside of the brace.  She is able to wean off crutches once and gait pattern normalizes  under direction of physical therapy.  She is going to follow-up in 3 weeks with Dr. Mortensen to reevaluate motion and strength she will need x-ray images right knee at follow-up.  She verbalized understanding of all information agrees with plan of care encouraged to call with any question concerns.  All question answered.

## 2024-12-20 ENCOUNTER — HOSPITAL ENCOUNTER (OUTPATIENT)
Dept: PHYSICAL THERAPY | Facility: HOSPITAL | Age: 44
Setting detail: THERAPIES SERIES
Discharge: HOME | End: 2024-12-20

## 2024-12-20 DIAGNOSIS — S83.231A COMPLEX TEAR OF MEDIAL MENISCUS OF RIGHT KNEE AS CURRENT INJURY, INITIAL ENCOUNTER: ICD-10-CM

## 2024-12-20 DIAGNOSIS — S83.511A RUPTURE OF ANTERIOR CRUCIATE LIGAMENT OF RIGHT KNEE, INITIAL ENCOUNTER: Primary | ICD-10-CM

## 2024-12-20 PROCEDURE — 97110 THERAPEUTIC EXERCISES: CPT

## 2024-12-20 NOTE — THERAPY TREATMENT NOTE
Outpatient Physical Therapy Ortho Treatment Note  [facility]     Patient Name: Becca Fonseca  : 1980  MRN: [id number]  Today's Date: 2024      Visit Date: 2024    Visit Dx:    ICD-10-CM ICD-9-CM   1. Rupture of anterior cruciate ligament of right knee, initial encounter  S83.511A 844.2   2. Complex tear of medial meniscus of right knee as current injury, initial encounter  S83.231A 836.0       Patient Active Problem List   Diagnosis    Rupture of anterior cruciate ligament of right knee    Complex tear of medial meniscus of right knee as current injury    Status post anterior cruciate ligament surgery    Encounter to establish care        Past Medical History:   Diagnosis Date    PONV (postoperative nausea and vomiting)         Past Surgical History:   Procedure Laterality Date    KNEE ACL RECONSTRUCTION Right 2024    Procedure: Knee arthroscopy, anterior cruciate ligament reconstruction with hamstring autograft, possible allograft supplement, meniscal and cartilage surgery as indicated;  Surgeon: David Mortensen MD;  Location: [facility];  Service: Orthopedics;  Laterality: Right;    TUBAL ABDOMINAL LIGATION                          PT Assessment/Plan       Row Name 24 0900          PT Assessment    Assessment Comments Patient's right knee ROM continues to be poor. She continues to ambulate with a FWW and a very slow gait pattern and speed. Patient unable to tolerate progressions with ROM and strengthening exercises today due to pain and difficulty breathing. Patient states she can only lay on her back for short periods of time or sit up against the wall. Plan to progress patient as tolerated.  -AS        PT Plan    PT Plan Comments Continue with current treatment plan.  -AS               User Key  (r) = Recorded By, (t) = Taken By, (c) = Cosigned By      Initials Name Provider Type    AS Sheldon Helms, PT Physical Therapist                       OP Exercises        Row Name 12/20/24 1043 12/20/24 0900          Subjective    Subjective Comments -- Patient states her knee feels sore but overall doing well.  -AS        Total Minutes    58737 - PT Therapeutic Exercise Minutes 50  -AS --        Exercise 1    Exercise Name 1 -- Heel Slides  -AS     Time 1 -- 8 min  -AS        Exercise 2    Exercise Name 2 -- Wall Slides  -AS     Time 2 -- --  -AS        Exercise 3    Exercise Name 3 -- Bike  -AS        Exercise 4    Exercise Name 4 -- HS Stretch  -AS     Reps 4 -- 10  -AS     Time 4 -- 10 sec hold each  -AS        Exercise 5    Exercise Name 5 -- Heel Prop  -AS     Time 5 -- 5 min  -AS        Exercise 6    Exercise Name 6 -- QS vs Bhutanese  -AS     Time 6 -- 10 min, 10/10 co-contract  -AS        Exercise 7    Exercise Name 7 -- SLR  -AS     Reps 7 -- 25 each  -AS     Time 7 -- --  -AS        Exercise 8    Exercise Name 8 -- Ankle PF vs Band  -AS     Reps 8 -- 25  -AS     Time 8 -- Gold  -AS        Exercise 9    Exercise Name 9 -- LAQ  -AS     Reps 9 -- --  -AS               User Key  (r) = Recorded By, (t) = Taken By, (c) = Cosigned By      Initials Name Provider Type    AS Sheldon Helms, PT Physical Therapist                                                    Time Calculation:   Start Time: 0947  Stop Time: 1041  Time Calculation (min): 54 min  Timed Charges  38307 - PT Therapeutic Exercise Minutes: 50  Total Minutes  Timed Charges Total Minutes: 50   Total Minutes: 50  Therapy Charges for Today       Code Description Service Date Service Provider Modifiers Qty    [id number]  PT THER PROC EA 15 MIN 12/20/2024 Sheldon Helms, PT GP 2                      Sheldon Helms, PT  12/20/2024

## 2024-12-23 ENCOUNTER — HOSPITAL ENCOUNTER (OUTPATIENT)
Dept: PHYSICAL THERAPY | Facility: HOSPITAL | Age: 44
Setting detail: THERAPIES SERIES
Discharge: HOME | End: 2024-12-23

## 2024-12-23 DIAGNOSIS — S83.231A COMPLEX TEAR OF MEDIAL MENISCUS OF RIGHT KNEE AS CURRENT INJURY, INITIAL ENCOUNTER: ICD-10-CM

## 2024-12-23 DIAGNOSIS — S83.511A RUPTURE OF ANTERIOR CRUCIATE LIGAMENT OF RIGHT KNEE, INITIAL ENCOUNTER: Primary | ICD-10-CM

## 2024-12-23 PROCEDURE — 97110 THERAPEUTIC EXERCISES: CPT

## 2024-12-23 NOTE — THERAPY TREATMENT NOTE
Outpatient Physical Therapy Ortho Treatment Note   [place]     Patient Name: Becca Fonseca  : 1980  MRN: [id number]  Today's Date: 2024      Visit Date: 2024    Visit Dx:    ICD-10-CM ICD-9-CM   1. Rupture of anterior cruciate ligament of right knee, initial encounter  S83.511A 844.2   2. Complex tear of medial meniscus of right knee as current injury, initial encounter  S83.231A 836.0       Patient Active Problem List   Diagnosis    Rupture of anterior cruciate ligament of right knee    Complex tear of medial meniscus of right knee as current injury    Status post anterior cruciate ligament surgery    Encounter to establish care        Past Medical History:   Diagnosis Date    PONV (postoperative nausea and vomiting)         Past Surgical History:   Procedure Laterality Date    KNEE ACL RECONSTRUCTION Right 2024    Procedure: Knee arthroscopy, anterior cruciate ligament reconstruction with hamstring autograft, possible allograft supplement, meniscal and cartilage surgery as indicated;  Surgeon: David Mortensen MD;  Location: [facility];  Service: Orthopedics;  Laterality: Right;    TUBAL ABDOMINAL LIGATION          PT Ortho       Row Name 24 0900       Right Lower Ext    Rt Knee Extension/Flexion AROM 0-95 degrees post stretch  -AS              User Key  (r) = Recorded By, (t) = Taken By, (c) = Cosigned By      Initials Name Provider Type    AS Sheldon Helms, PT Physical Therapist                                 PT Assessment/Plan       Row Name 24 0900          PT Assessment    Assessment Comments Patient doing much better this morning. She is feeling better due to medication adjustments. Patient is ambulating with improved gait pattern and speed with FWW, better right knee ROM and improved tolerance to exercises. Able to progress patient with ROM and strengthening exercises today and she tolerated this well. Plan to continue to progress patient as  "tolerated.  -AS        PT Plan    PT Plan Comments Continue with current treatment plan.  -AS               User Key  (r) = Recorded By, (t) = Taken By, (c) = Cosigned By      Initials Name Provider Type    AS Sheldon Helms, PT Physical Therapist                       OP Exercises       Row Name 12/23/24 1039 12/23/24 0900          Subjective    Subjective Comments -- Patient states she is \"feeling great.\"  -AS        Total Minutes    41879 - PT Therapeutic Exercise Minutes 60  -AS --        Exercise 1    Exercise Name 1 -- Heel Slides  -AS     Time 1 -- 8 min  -AS        Exercise 2    Exercise Name 2 -- Wall Slides  -AS     Time 2 -- 8 min  -AS        Exercise 3    Exercise Name 3 -- Bike  -AS        Exercise 4    Exercise Name 4 -- HS Stretch  -AS     Reps 4 -- 10  -AS     Time 4 -- 10 sec hold each  -AS        Exercise 5    Exercise Name 5 -- Heel Prop  -AS     Time 5 -- 5 min  -AS        Exercise 6    Exercise Name 6 -- QS vs Bhutanese  -AS     Time 6 -- 10 min, 10/10 co-contract  -AS        Exercise 7    Exercise Name 7 -- 4-Way Hip  -AS     Reps 7 -- 25 each  -AS        Exercise 8    Exercise Name 8 -- Ankle PF vs Band  -AS     Reps 8 -- 25  -AS     Time 8 -- Gold  -AS        Exercise 9    Exercise Name 9 -- LAQ  -AS     Reps 9 -- 25  -AS        Exercise 10    Exercise Name 10 -- TKE  -AS        Exercise 11    Exercise Name 11 -- Mini Squats  -AS        Exercise 12    Exercise Name 12 -- FWD Step Ups  -AS        Exercise 13    Exercise Name 13 -- Lateral Step Overs  -AS        Exercise 14    Exercise Name 14 -- Lateral Dips  -AS        Exercise 15    Exercise Name 15 -- 3-Way Cable Walks  -AS               User Key  (r) = Recorded By, (t) = Taken By, (c) = Cosigned By      Initials Name Provider Type    AS Sheldon Helms, PT Physical Therapist                                                    Time Calculation:   Start Time: 0930  Stop Time: 1034  Time Calculation (min): 64 min  Timed " Charges  58446 - PT Therapeutic Exercise Minutes: 60  Total Minutes  Timed Charges Total Minutes: 60   Total Minutes: 60  Therapy Charges for Today       Code Description Service Date Service Provider Modifiers Qty    [id number]  PT THER PROC EA 15 MIN 12/23/2024 Sheldon Helms, PT GP 3                      Sheldon Helms, PT  12/23/2024

## 2024-12-26 DIAGNOSIS — Z98.890 STATUS POST ANTERIOR CRUCIATE LIGAMENT SURGERY: ICD-10-CM

## 2024-12-26 RX ORDER — HYDROCODONE BITARTRATE AND ACETAMINOPHEN 5; 325 MG/1; MG/1
1 TABLET ORAL EVERY 6 HOURS PRN
Qty: 30 TABLET | Refills: 0 | Status: SHIPPED | OUTPATIENT
Start: 2024-12-26

## 2024-12-26 NOTE — TELEPHONE ENCOUNTER
Dr. Mortensen patient, he is out of office.      Rx Refill Note  Requested Prescriptions     Pending Prescriptions Disp Refills    HYDROcodone-acetaminophen (NORCO) 5-325 MG per tablet 30 tablet 0     Sig: Take 1 tablet by mouth Every 6 (Six) Hours As Needed for Moderate Pain or Severe Pain.      Last office visit with prescribing clinician: Visit date not found      Next office visit with prescribing clinician: Visit date not found   Last Filled:12/19/2024    Encounter Diagnosis   Name Primary?    Status post right knee ACL reconstruction with hamstring autograft, medial meniscal repair, DOS 12/12/2024       Date of Surgery:12/12/2024      Shelley Esparza MA  12/26/24, 08:36 EST    Previous RX pended for your approval, change or denial.     {TIP  Encounters:    {TIP  Please add Last Relevant Lab Date if appropriate:  {TIP  Is Refill Pharmacy correct?:

## 2024-12-27 ENCOUNTER — HOSPITAL ENCOUNTER (OUTPATIENT)
Dept: PHYSICAL THERAPY | Facility: HOSPITAL | Age: 44
Setting detail: THERAPIES SERIES
Discharge: HOME | End: 2024-12-27

## 2024-12-27 DIAGNOSIS — S83.511A RUPTURE OF ANTERIOR CRUCIATE LIGAMENT OF RIGHT KNEE, INITIAL ENCOUNTER: Primary | ICD-10-CM

## 2024-12-27 DIAGNOSIS — S83.231A COMPLEX TEAR OF MEDIAL MENISCUS OF RIGHT KNEE AS CURRENT INJURY, INITIAL ENCOUNTER: ICD-10-CM

## 2024-12-27 PROCEDURE — 97110 THERAPEUTIC EXERCISES: CPT

## 2024-12-27 NOTE — THERAPY TREATMENT NOTE
Outpatient Physical Therapy Ortho Treatment Note   [facility]     Patient Name: Becca Fonseca  : 1980  MRN: [id number]  Today's Date: 2024      Visit Date: 2024    Visit Dx:    ICD-10-CM ICD-9-CM   1. Rupture of anterior cruciate ligament of right knee, initial encounter  S83.511A 844.2   2. Complex tear of medial meniscus of right knee as current injury, initial encounter  S83.231A 836.0       Patient Active Problem List   Diagnosis    Rupture of anterior cruciate ligament of right knee    Complex tear of medial meniscus of right knee as current injury    Status post anterior cruciate ligament surgery    Encounter to establish care        Past Medical History:   Diagnosis Date    PONV (postoperative nausea and vomiting)         Past Surgical History:   Procedure Laterality Date    KNEE ACL RECONSTRUCTION Right 2024    Procedure: Knee arthroscopy, anterior cruciate ligament reconstruction with hamstring autograft, possible allograft supplement, meniscal and cartilage surgery as indicated;  Surgeon: David Mortensen MD;  Location: [facility];  Service: Orthopedics;  Laterality: Right;    TUBAL ABDOMINAL LIGATION          PT Ortho       Row Name 24 0900       Right Lower Ext    Rt Knee Extension/Flexion AROM 0-104 degrees post stretch  -AS              User Key  (r) = Recorded By, (t) = Taken By, (c) = Cosigned By      Initials Name Provider Type    AS Sheldon Helms, PT Physical Therapist                                 PT Assessment/Plan       Row Name 24 0900          PT Assessment    Assessment Comments Patient with decreased right knee ROM compared to her last PT visit, however this did improve as she completed her ROM exercises. Plan to continue to push ROM and progress strengthening as tolerated.  -AS        PT Plan    PT Plan Comments Continue with current treatment plan.  -AS               User Key  (r) = Recorded By, (t) = Taken By, (c) = Cosigned By  "     Initials Name Provider Type    AS Sheldon Helms, PT Physical Therapist                       OP Exercises       Row Name 12/27/24 1018 12/27/24 0900          Subjective    Subjective Comments -- Patient states her knee is doing ok. She states she has been compliant with her exercises. She does report having some \"popping\" sensations around her incisions and knee cap over the past few days. She states her knee is hurting today and her ROM is not what it has been previous to today.  -AS        Total Minutes    16790 - PT Therapeutic Exercise Minutes 50  -AS --        Exercise 1    Exercise Name 1 -- Heel Slides  -AS     Time 1 -- 8 min  -AS        Exercise 2    Exercise Name 2 -- Wall Slides  -AS     Time 2 -- 8 min  -AS        Exercise 3    Exercise Name 3 -- Bike  -AS        Exercise 4    Exercise Name 4 -- HS Stretch  -AS     Reps 4 -- 10  -AS     Time 4 -- 10 sec hold each  -AS        Exercise 5    Exercise Name 5 -- Heel Prop  -AS     Time 5 -- 5 min  -AS        Exercise 6    Exercise Name 6 -- QS vs [place]  -AS     Time 6 -- 10 min, 10/10 co-contract  -AS        Exercise 7    Exercise Name 7 -- 4-Way Hip  -AS     Reps 7 -- 25 each  -AS        Exercise 8    Exercise Name 8 -- Heel Raises  -AS     Reps 8 -- 25  -AS     Time 8 -- --  -AS        Exercise 9    Exercise Name 9 -- LAQ  -AS     Reps 9 -- 25  -AS        Exercise 10    Exercise Name 10 -- TKE  -AS        Exercise 11    Exercise Name 11 -- Mini Squats  -AS        Exercise 12    Exercise Name 12 -- FWD Step Ups  -AS        Exercise 13    Exercise Name 13 -- Lateral Step Overs  -AS        Exercise 14    Exercise Name 14 -- Lateral Dips  -AS        Exercise 15    Exercise Name 15 -- 3-Way Cable Walks  -AS               User Key  (r) = Recorded By, (t) = Taken By, (c) = Cosigned By      Initials Name Provider Type    AS Sheldon Helms, PT Physical Therapist                                                    Time Calculation:   Start Time: " 0924  Stop Time: 1018  Time Calculation (min): 54 min  Timed Charges  37553 - PT Therapeutic Exercise Minutes: 50  Total Minutes  Timed Charges Total Minutes: 50   Total Minutes: 50  Therapy Charges for Today       Code Description Service Date Service Provider Modifiers Qty    [id number]  PT THER PROC EA 15 MIN 12/27/2024 Sheldon Helms, PT GP 3                      Sheldon Helms, PT  12/27/2024

## 2024-12-30 ENCOUNTER — HOSPITAL ENCOUNTER (OUTPATIENT)
Dept: PHYSICAL THERAPY | Facility: HOSPITAL | Age: 44
Setting detail: THERAPIES SERIES
Discharge: HOME | End: 2024-12-30

## 2024-12-30 DIAGNOSIS — S83.511A RUPTURE OF ANTERIOR CRUCIATE LIGAMENT OF RIGHT KNEE, INITIAL ENCOUNTER: Primary | ICD-10-CM

## 2024-12-30 DIAGNOSIS — S83.231A COMPLEX TEAR OF MEDIAL MENISCUS OF RIGHT KNEE AS CURRENT INJURY, INITIAL ENCOUNTER: ICD-10-CM

## 2024-12-30 PROCEDURE — 97110 THERAPEUTIC EXERCISES: CPT | Performed by: SPECIALIST/TECHNOLOGIST

## 2024-12-30 NOTE — THERAPY TREATMENT NOTE
Outpatient Physical Therapy Ortho Treatment Note  [facility]     Patient Name: Becca Fonseca  : 1980  MRN: [id number]  Today's Date: 2024      Visit Date: 2024    Visit Dx:    ICD-10-CM ICD-9-CM   1. Rupture of anterior cruciate ligament of right knee, initial encounter  S83.511A 844.2   2. Complex tear of medial meniscus of right knee as current injury, initial encounter  S83.231A 836.0       Patient Active Problem List   Diagnosis    Rupture of anterior cruciate ligament of right knee    Complex tear of medial meniscus of right knee as current injury    Status post anterior cruciate ligament surgery    Encounter to establish care        Past Medical History:   Diagnosis Date    PONV (postoperative nausea and vomiting)         Past Surgical History:   Procedure Laterality Date    KNEE ACL RECONSTRUCTION Right 2024    Procedure: Knee arthroscopy, anterior cruciate ligament reconstruction with hamstring autograft, possible allograft supplement, meniscal and cartilage surgery as indicated;  Surgeon: David Mortensen MD;  Location: [facility];  Service: Orthopedics;  Laterality: Right;    TUBAL ABDOMINAL LIGATION          PT Ortho       Row Name 24 0952       Right Lower Ext    Rt Knee Extension/Flexion AROM 0-106 degrees post stretch  -KF              User Key  (r) = Recorded By, (t) = Taken By, (c) = Cosigned By      Initials Name Provider Type    Tata Martin PTA Physical Therapist Assistant                                 PT Assessment/Plan       Row Name 24 0952          PT Assessment    Assessment Comments Continue to push ROM and strengthening as tolerated. Practiced walking with use of SPC and pt tolerated well.  -KF        PT Plan    PT Plan Comments Continue per POC  -KF               User Key  (r) = Recorded By, (t) = Taken By, (c) = Cosigned By      Initials Name Provider Type    Tata Martin PTA Physical Therapist Assistant                        OP Exercises       Row Name 12/30/24 0952             Subjective    Subjective Comments Pt states her knee is doing well; she continues to experience some popping but is not painful. States she has been compliant with HEP  -KF         Exercise 1    Exercise Name 1 Heel Slides  -KF      Time 1 8 min  -KF         Exercise 2    Exercise Name 2 Wall Slides  -KF      Time 2 8 min  -KF         Exercise 3    Exercise Name 3 Bike (seat 0)  -KF      Time 3 5 min  -KF         Exercise 4    Exercise Name 4 HS Stretch  -KF      Reps 4 10  -KF      Time 4 10 sec hold each  -KF         Exercise 5    Exercise Name 5 Heel Prop  -KF      Time 5 5 min  -KF         Exercise 6    Exercise Name 6 QS vs Yemeni  -KF      Time 6 10 min, 10/10 co-contract  -KF         Exercise 7    Exercise Name 7 4-Way Hip  -KF      Reps 7 25 each  -KF         Exercise 8    Exercise Name 8 Heel Raises  -KF      Reps 8 25  -KF         Exercise 9    Exercise Name 9 LAQ  -KF      Reps 9 25  -KF         Exercise 10    Exercise Name 10 TKE  -KF      Reps 10 25  -KF      Time 10 Silver  -KF         Exercise 11    Exercise Name 11 Mini Squats  -KF         Exercise 12    Exercise Name 12 FWD Step Ups  -KF         Exercise 13    Exercise Name 13 Lateral Step Overs  -KF         Exercise 14    Exercise Name 14 Lateral Dips  -KF         Exercise 15    Exercise Name 15 3-Way Cable Walks  -KF                User Key  (r) = Recorded By, (t) = Taken By, (c) = Cosigned By      Initials Name Provider Type    KF Tata Ortiz PTA Physical Therapist Assistant                                                    Time Calculation:   Start Time: 0952  Stop Time: 1100  Time Calculation (min): 68 min  Therapy Charges for Today       Code Description Service Date Service Provider Modifiers Qty    08803461773  PT THER PROC EA 15 MIN 12/30/2024 Tata Ortiz PTA GP 2                      Tata Ortiz PTA  12/30/2024

## 2024-12-31 DIAGNOSIS — Z98.890 STATUS POST ANTERIOR CRUCIATE LIGAMENT SURGERY: ICD-10-CM

## 2024-12-31 RX ORDER — SENNOSIDES A AND B 8.6 MG/1
1 TABLET, FILM COATED ORAL NIGHTLY
Qty: 20 TABLET | Refills: 0 | Status: SHIPPED | OUTPATIENT
Start: 2024-12-31

## 2024-12-31 RX ORDER — HYDROCODONE BITARTRATE AND ACETAMINOPHEN 5; 325 MG/1; MG/1
1 TABLET ORAL EVERY 4 HOURS PRN
Qty: 50 TABLET | Refills: 0 | Status: SHIPPED | OUTPATIENT
Start: 2024-12-31

## 2024-12-31 NOTE — TELEPHONE ENCOUNTER
Rx Refill Note  Requested Prescriptions      No prescriptions requested or ordered in this encounter      Last office visit with prescribing clinician: 11/18/2024      Next office visit with prescribing clinician: 1/8/2025   Last Filled: 12/26/2024    This fill is early because patient is having to take more frequently.  I have changed sig to every four hours so please confirm this change.    Right ACL reconstruction.  Date of Surgery:12/12/2024      Shelley Esparza MA  12/31/24, 14:14 EST    Previous RX pended for your approval, change or denial.     {TIP  Encounters:    {TIP  Please add Last Relevant Lab Date if appropriate:  {TIP  Is Refill Pharmacy correct?:

## 2025-01-03 ENCOUNTER — HOSPITAL ENCOUNTER (OUTPATIENT)
Dept: PHYSICAL THERAPY | Facility: HOSPITAL | Age: 45
Setting detail: THERAPIES SERIES
Discharge: HOME | End: 2025-01-03

## 2025-01-03 DIAGNOSIS — S83.511A RUPTURE OF ANTERIOR CRUCIATE LIGAMENT OF RIGHT KNEE, INITIAL ENCOUNTER: Primary | ICD-10-CM

## 2025-01-03 DIAGNOSIS — S83.231A COMPLEX TEAR OF MEDIAL MENISCUS OF RIGHT KNEE AS CURRENT INJURY, INITIAL ENCOUNTER: ICD-10-CM

## 2025-01-03 PROCEDURE — 97110 THERAPEUTIC EXERCISES: CPT | Performed by: SPECIALIST/TECHNOLOGIST

## 2025-01-03 NOTE — THERAPY TREATMENT NOTE
Outpatient Physical Therapy Ortho Treatment Note  [facility]     Patient Name: Becca Fonseca  : 1980  MRN: [id number]  Today's Date: 1/3/2025      Visit Date: 2025    Visit Dx:    ICD-10-CM ICD-9-CM   1. Rupture of anterior cruciate ligament of right knee, initial encounter  S83.511A 844.2   2. Complex tear of medial meniscus of right knee as current injury, initial encounter  S83.231A 836.0       Patient Active Problem List   Diagnosis    Rupture of anterior cruciate ligament of right knee    Complex tear of medial meniscus of right knee as current injury    Status post anterior cruciate ligament surgery    Encounter to establish care        Past Medical History:   Diagnosis Date    PONV (postoperative nausea and vomiting)         Past Surgical History:   Procedure Laterality Date    KNEE ACL RECONSTRUCTION Right 2024    Procedure: Knee arthroscopy, anterior cruciate ligament reconstruction with hamstring autograft, possible allograft supplement, meniscal and cartilage surgery as indicated;  Surgeon: David Mortensen MD;  Location: [facility];  Service: Orthopedics;  Laterality: Right;    TUBAL ABDOMINAL LIGATION          PT Ortho       Row Name 25 1005       Right Lower Ext    Rt Knee Extension/Flexion AROM 0-111 degrees post stretch  -KF              User Key  (r) = Recorded By, (t) = Taken By, (c) = Cosigned By      Initials Name Provider Type    Tata Martin PTA Physical Therapist Assistant                                 PT Assessment/Plan       Row Name 25 1005          PT Assessment    Assessment Comments Pt is slowly improving with ROM and able to make full revolutions on the bike; continue to progress ther ex as tolerated. Unable to add WBing activity due to pts time restraints.  -KF        PT Plan    PT Plan Comments Continue per POC  -KF               User Key  (r) = Recorded By, (t) = Taken By, (c) = Cosigned By      Initials Name Provider Type    YOLANDA  "Tata Ortiz PTA Physical Therapist Assistant                       OP Exercises       Row Name 01/03/25 1005             Subjective    Subjective Comments Pt states her knee is doing well. States she is sitting with her knee bent more and uses heat to relieve nerve pain.  -KF         Exercise 1    Exercise Name 1 Heel Slides  -KF      Time 1 8 min  -KF         Exercise 2    Exercise Name 2 Wall Slides  -KF      Time 2 8 min  -KF         Exercise 3    Exercise Name 3 Bike (seat 0)  -KF      Time 3 5 min  -KF         Exercise 4    Exercise Name 4 HS Stretch  -KF      Reps 4 10  -KF      Time 4 10 sec hold each  -KF         Exercise 5    Exercise Name 5 Heel Prop  -KF      Time 5 5 min  -KF         Exercise 6    Exercise Name 6 QS vs [place]  -KF      Time 6 10 min, 10/10 co-contract  -KF         Exercise 7    Exercise Name 7 4-Way Hip  -KF      Reps 7 25 each  -KF      Time 7 1#  -KF         Exercise 8    Exercise Name 8 Heel Raises  -KF      Reps 8 25  -KF         Exercise 9    Exercise Name 9 LAQ  -KF      Reps 9 25  -KF         Exercise 10    Exercise Name 10 TKE  -KF      Reps 10 25  -KF      Time 10 Silver  -KF         Exercise 11    Exercise Name 11 Mini Squats  -KF      Reps 11 --  -KF         Exercise 12    Exercise Name 12 4\" FWD Step Ups  -KF         Exercise 13    Exercise Name 13 Lateral Step Overs  -KF         Exercise 14    Exercise Name 14 Lateral Dips  -KF         Exercise 15    Exercise Name 15 3-Way Cable Walks  -KF                User Key  (r) = Recorded By, (t) = Taken By, (c) = Cosigned By      Initials Name Provider Type    KF Tata Ortiz PTA Physical Therapist Assistant                                                    Time Calculation:   Start Time: 1005  Stop Time: 1106  Time Calculation (min): 61 min  Therapy Charges for Today       Code Description Service Date Service Provider Modifiers Qty    [id number]  PT THER PROC EA 15 MIN 1/3/2025 Tata Ortiz PTA GP 2              "         Tata Ortiz, PTA  1/3/2025

## 2025-01-08 ENCOUNTER — HOSPITAL ENCOUNTER (OUTPATIENT)
Dept: PHYSICAL THERAPY | Facility: HOSPITAL | Age: 45
Setting detail: THERAPIES SERIES
Discharge: HOME | End: 2025-01-08

## 2025-01-08 ENCOUNTER — OFFICE VISIT (OUTPATIENT)
Dept: ORTHOPEDIC SURGERY | Facility: CLINIC | Age: 45
End: 2025-01-08

## 2025-01-08 VITALS — WEIGHT: 151 LBS | BODY MASS INDEX: 23.7 KG/M2 | HEIGHT: 67 IN

## 2025-01-08 DIAGNOSIS — S83.231A COMPLEX TEAR OF MEDIAL MENISCUS OF RIGHT KNEE AS CURRENT INJURY, INITIAL ENCOUNTER: ICD-10-CM

## 2025-01-08 DIAGNOSIS — S83.511A RUPTURE OF ANTERIOR CRUCIATE LIGAMENT OF RIGHT KNEE, INITIAL ENCOUNTER: Primary | ICD-10-CM

## 2025-01-08 DIAGNOSIS — Z98.890 STATUS POST ANTERIOR CRUCIATE LIGAMENT SURGERY: ICD-10-CM

## 2025-01-08 DIAGNOSIS — Z98.890 STATUS POST ANTERIOR CRUCIATE LIGAMENT SURGERY: Primary | ICD-10-CM

## 2025-01-08 DIAGNOSIS — M79.2 NEUROPATHIC PAIN: ICD-10-CM

## 2025-01-08 PROCEDURE — 97110 THERAPEUTIC EXERCISES: CPT | Performed by: SPECIALIST/TECHNOLOGIST

## 2025-01-08 RX ORDER — HYDROCODONE BITARTRATE AND ACETAMINOPHEN 5; 325 MG/1; MG/1
1 TABLET ORAL EVERY 4 HOURS PRN
Qty: 50 TABLET | Refills: 0 | Status: SHIPPED | OUTPATIENT
Start: 2025-01-08

## 2025-01-08 RX ORDER — PREGABALIN 75 MG/1
75 CAPSULE ORAL 2 TIMES DAILY
Qty: 60 CAPSULE | Refills: 0 | Status: SHIPPED | OUTPATIENT
Start: 2025-01-08

## 2025-01-08 NOTE — THERAPY TREATMENT NOTE
Outpatient Physical Therapy Ortho Treatment Note  [facility]     Patient Name: Becca Fonseca  : 1980  MRN: [id number]  Today's Date: 2025      Visit Date: 2025    Visit Dx:    ICD-10-CM ICD-9-CM   1. Rupture of anterior cruciate ligament of right knee, initial encounter  S83.511A 844.2   2. Complex tear of medial meniscus of right knee as current injury, initial encounter  S83.231A 836.0       Patient Active Problem List   Diagnosis    Rupture of anterior cruciate ligament of right knee    Complex tear of medial meniscus of right knee as current injury    Status post anterior cruciate ligament surgery    Encounter to establish care        Past Medical History:   Diagnosis Date    PONV (postoperative nausea and vomiting)         Past Surgical History:   Procedure Laterality Date    KNEE ACL RECONSTRUCTION Right 2024    Procedure: Knee arthroscopy, anterior cruciate ligament reconstruction with hamstring autograft, possible allograft supplement, meniscal and cartilage surgery as indicated;  Surgeon: David Mortensen MD;  Location: [facility];  Service: Orthopedics;  Laterality: Right;    TUBAL ABDOMINAL LIGATION          PT Ortho       Row Name 25 0940       Right Lower Ext    Rt Knee Extension/Flexion AROM 0-128 degrees post stretch  -KF              User Key  (r) = Recorded By, (t) = Taken By, (c) = Cosigned By      Initials Name Provider Type    Tata Martin PTA Physical Therapist Assistant                                 PT Assessment/Plan       Row Name 25 0940          PT Assessment    Assessment Comments Pt ambulates with SPC; pt measures increased AROM 0-128 degrees and tolerated progression of ther ex well including WBing activity.  -KF        PT Plan    PT Plan Comments Continue per POC. Add bridges  -KF               User Key  (r) = Recorded By, (t) = Taken By, (c) = Cosigned By      Initials Name Provider Type    Tata Martin PTA Physical  "Therapist Assistant                       OP Exercises       Row Name 01/08/25 0940             Subjective    Subjective Comments Pt states her f/u appointment went well and MD was pleased with her progress. Pt states she was prescribed lyrica for nerve symptoms.  -KF         Exercise 1    Exercise Name 1 Heel Slides  -KF      Time 1 8 min  -KF         Exercise 2    Exercise Name 2 Wall Slides  -KF      Time 2 8 min  -KF         Exercise 3    Exercise Name 3 Bike (seat 0)  -KF      Time 3 5 min  -KF         Exercise 4    Exercise Name 4 HS Stretch  -KF      Reps 4 10  -KF      Time 4 10 sec hold each  -KF         Exercise 5    Exercise Name 5 Heel Prop  -KF      Time 5 5 min  -KF         Exercise 6    Exercise Name 6 QS vs [place]  -KF      Time 6 10 min, 10/10 co-contract  -KF         Exercise 7    Exercise Name 7 4-Way Hip  -KF      Reps 7 25 each  -KF      Time 7 2#  -KF         Exercise 8    Exercise Name 8 Heel Raises  -KF      Reps 8 25  -KF         Exercise 9    Exercise Name 9 LAQ  -KF      Reps 9 25  -KF         Exercise 10    Exercise Name 10 TKE  -KF      Reps 10 25  -KF      Time 10 Silver  -KF         Exercise 11    Exercise Name 11 Mini Squats  -KF      Reps 11 25  -KF         Exercise 12    Exercise Name 12 4\" FWD Step Ups  -KF         Exercise 13    Exercise Name 13 Lateral Step Overs  -KF         Exercise 14    Exercise Name 14 Lateral Dips  -KF         Exercise 15    Exercise Name 15 3-Way Cable Walks  -KF                User Key  (r) = Recorded By, (t) = Taken By, (c) = Cosigned By      Initials Name Provider Type    KF Tata Ortiz PTA Physical Therapist Assistant                                                    Time Calculation:   Start Time: 0940  Stop Time: 1050  Time Calculation (min): 70 min  Therapy Charges for Today       Code Description Service Date Service Provider Modifiers Qty    [id number]  PT THER PROC EA 15 MIN 1/8/2025 Tata Ortiz PTA GP 2                      Tata " Diana, ROBERT  1/8/2025

## 2025-01-08 NOTE — PROGRESS NOTES
CC:Status post right knee ACL reconstruction with hamstring autograft, medial meniscal repair-12/12/2024    Interval history: Patient returns to clinic today, 4 weeks from surgery, doing well with physical therapy in regards to strengthening, range of motion, and return to normal gait.  Neuropathic pain in right thigh at site of adductor catheter and extending into saphenous nerve distribution. Denies any locking, catching, or giving way of right knee.  Has continued to wear the brace as instructed.  Denies any numbness, tingling, or issues with incisions over the knee.    Physical exam:              Right knee:   Incisions- clean dry, and intact, healing well   Effusion moderate     ROM- 0 - 110 degrees   Strength- 4 of 5, no extensor lag on SLR   stable to testing   Positive sensation light touch    Brisk cap refill    Imaging: three-view x-rays of right knee from today's visit including AP, lateral, and notch views, ordered and reviewed by me, compared to preoperative x-rays.  Findings included stable position of femoral and tibial bone tunnels as well as ACL hardware, no evidence of intra-articular loose body, anatomic alignment right knee, no evidence of fracture or subluxation.    Impression: Status post right knee ACL reconstruction, medial meniscal repair    Plan:  1.  Continue with physical therapy 3 times per week for work on range of motion and strengthening. Adding Lyrica for neuropathic pain, will add Prednisone taper if this does not improve  2.  Can unlock hinge knee brace but continue use at all times during ambulation.  3.  Will follow-up in 4 weeks for reevaluation of motion and strength.  4.  All questions answered today

## 2025-01-13 ENCOUNTER — HOSPITAL ENCOUNTER (OUTPATIENT)
Dept: PHYSICAL THERAPY | Facility: HOSPITAL | Age: 45
Setting detail: THERAPIES SERIES
Discharge: HOME | End: 2025-01-13

## 2025-01-13 DIAGNOSIS — S83.511A RUPTURE OF ANTERIOR CRUCIATE LIGAMENT OF RIGHT KNEE, INITIAL ENCOUNTER: Primary | ICD-10-CM

## 2025-01-13 DIAGNOSIS — S83.231A COMPLEX TEAR OF MEDIAL MENISCUS OF RIGHT KNEE AS CURRENT INJURY, INITIAL ENCOUNTER: ICD-10-CM

## 2025-01-13 PROCEDURE — 97110 THERAPEUTIC EXERCISES: CPT

## 2025-01-13 NOTE — THERAPY TREATMENT NOTE
Outpatient Physical Therapy Ortho Treatment Note  [facility]     Patient Name: Becca Fonseca  : 1980  MRN: [id number]  Today's Date: 2025      Visit Date: 2025    Visit Dx:    ICD-10-CM ICD-9-CM   1. Rupture of anterior cruciate ligament of right knee, initial encounter  S83.511A 844.2   2. Complex tear of medial meniscus of right knee as current injury, initial encounter  S83.231A 836.0       Patient Active Problem List   Diagnosis    Rupture of anterior cruciate ligament of right knee    Complex tear of medial meniscus of right knee as current injury    Status post anterior cruciate ligament surgery    Encounter to establish care        Past Medical History:   Diagnosis Date    PONV (postoperative nausea and vomiting)         Past Surgical History:   Procedure Laterality Date    KNEE ACL RECONSTRUCTION Right 2024    Procedure: Knee arthroscopy, anterior cruciate ligament reconstruction with hamstring autograft, possible allograft supplement, meniscal and cartilage surgery as indicated;  Surgeon: David Mortensen MD;  Location: [facility];  Service: Orthopedics;  Laterality: Right;    TUBAL ABDOMINAL LIGATION          PT Ortho       Row Name 25 0600       Right Lower Ext    Rt Knee Extension/Flexion AROM 0-138 degrees post stretch  -AS              User Key  (r) = Recorded By, (t) = Taken By, (c) = Cosigned By      Initials Name Provider Type    AS Sheldon Helms, PT Physical Therapist                                 PT Assessment/Plan       Row Name 25 0600          PT Assessment    Assessment Comments Patient continues to improve with right knee ROM, RLE strength, function, gait, as well as pain. Plan to continue to progress patient as tolerated.  -AS        PT Plan    PT Plan Comments Continue with current treatment plan.  -AS               User Key  (r) = Recorded By, (t) = Taken By, (c) = Cosigned By      Initials Name Provider Type    AS Sheldon Helms  "Nathan PT Physical Therapist                       OP Exercises       Row Name 01/13/25 0705 01/13/25 0600          Subjective    Subjective Comments -- Patient states her knee feels stiff today.  -AS        Total Minutes    55841 - PT Therapeutic Exercise Minutes 50  -AS --        Exercise 1    Exercise Name 1 -- Heel Slides  -AS     Time 1 -- 8 min  -AS        Exercise 2    Exercise Name 2 -- Wall Slides  -AS     Time 2 -- 8 min  -AS        Exercise 3    Exercise Name 3 -- Bike (seat 0)  -AS     Time 3 -- 5 min  -AS        Exercise 4    Exercise Name 4 -- HS Stretch  -AS     Reps 4 -- 10  -AS     Time 4 -- 10 sec hold each  -AS        Exercise 5    Exercise Name 5 -- Heel Prop  -AS     Time 5 -- 5 min  -AS        Exercise 6    Exercise Name 6 -- QS vs [place]ian  -AS     Time 6 -- 10 min, 10/10 co-contract  -AS        Exercise 7    Exercise Name 7 -- 4-Way Hip  -AS     Reps 7 -- 25 each  -AS     Time 7 -- 2#  -AS        Exercise 8    Exercise Name 8 -- Heel Raises  -AS     Reps 8 -- 25  -AS        Exercise 9    Exercise Name 9 -- LAQ  -AS     Reps 9 -- 25  -AS        Exercise 10    Exercise Name 10 -- TKE  -AS     Reps 10 -- 25  -AS     Time 10 -- Silver  -AS        Exercise 11    Exercise Name 11 -- Mini Squats  -AS     Reps 11 -- 25  -AS        Exercise 12    Exercise Name 12 -- 4\" FWD Step Ups  -AS        Exercise 13    Exercise Name 13 -- Lateral Step Overs  -AS        Exercise 14    Exercise Name 14 -- Lateral Dips  -AS        Exercise 15    Exercise Name 15 -- 3-Way Cable Walks  -AS               User Key  (r) = Recorded By, (t) = Taken By, (c) = Cosigned By      Initials Name Provider Type    AS Sheldon Helms, PT Physical Therapist                                                    Time Calculation:   Start Time: 0610  Stop Time: 0702  Time Calculation (min): 52 min  Timed Charges  75677 - PT Therapeutic Exercise Minutes: 50  Total Minutes  Timed Charges Total Minutes: 50   Total Minutes: 50  Therapy " Charges for Today       Code Description Service Date Service Provider Modifiers Qty    [id number] HC PT THER PROC EA 15 MIN 1/13/2025 Sheldon Helms, PT GP 3                      Sheldon Helms, PT  1/13/2025

## 2025-01-16 ENCOUNTER — HOSPITAL ENCOUNTER (OUTPATIENT)
Dept: PHYSICAL THERAPY | Facility: HOSPITAL | Age: 45
Setting detail: THERAPIES SERIES
Discharge: HOME | End: 2025-01-16

## 2025-01-16 ENCOUNTER — TELEPHONE (OUTPATIENT)
Dept: ORTHOPEDIC SURGERY | Facility: CLINIC | Age: 45
End: 2025-01-16

## 2025-01-16 DIAGNOSIS — S83.231A COMPLEX TEAR OF MEDIAL MENISCUS OF RIGHT KNEE AS CURRENT INJURY, INITIAL ENCOUNTER: ICD-10-CM

## 2025-01-16 DIAGNOSIS — S83.511A RUPTURE OF ANTERIOR CRUCIATE LIGAMENT OF RIGHT KNEE, INITIAL ENCOUNTER: Primary | ICD-10-CM

## 2025-01-16 DIAGNOSIS — Z98.890 STATUS POST ANTERIOR CRUCIATE LIGAMENT SURGERY: Primary | ICD-10-CM

## 2025-01-16 PROCEDURE — 97110 THERAPEUTIC EXERCISES: CPT

## 2025-01-16 RX ORDER — PREGABALIN 50 MG/1
50 CAPSULE ORAL 3 TIMES DAILY
Qty: 90 CAPSULE | Refills: 0 | Status: SHIPPED | OUTPATIENT
Start: 2025-01-16

## 2025-01-16 NOTE — THERAPY RE-EVALUATION
Outpatient Physical Therapy Ortho Re-Evaluation   [place]     Patient Name: Becca Fonseca  : 1980  MRN: [id number]  Today's Date: 2025      Visit Date: 2025    Patient Active Problem List   Diagnosis    Rupture of anterior cruciate ligament of right knee    Complex tear of medial meniscus of right knee as current injury    Status post anterior cruciate ligament surgery    Encounter to establish care        Past Medical History:   Diagnosis Date    PONV (postoperative nausea and vomiting)         Past Surgical History:   Procedure Laterality Date    KNEE ACL RECONSTRUCTION Right 2024    Procedure: Knee arthroscopy, anterior cruciate ligament reconstruction with hamstring autograft, possible allograft supplement, meniscal and cartilage surgery as indicated;  Surgeon: David Mortensen MD;  Location: [facility];  Service: Orthopedics;  Laterality: Right;    TUBAL ABDOMINAL LIGATION         Visit Dx:     ICD-10-CM ICD-9-CM   1. Rupture of anterior cruciate ligament of right knee, initial encounter  S83.511A 844.2   2. Complex tear of medial meniscus of right knee as current injury, initial encounter  S83.231A 836.0              PT Ortho       Row Name 25 0600       Precautions and Contraindications    Precautions/Limitations no known precautions/limitations  -AS       Subjective Pain    Able to rate subjective pain? yes  -AS    Pre-Treatment Pain Level 5  -AS    Post-Treatment Pain Level 5  -AS       Posture/Observations    Posture- WNL Posture is WNL  -AS    Observations Incision healing;Muscle atrophy  -AS       Patellar Accessory Motions    Superior glide Right:;WNL  -AS    Inferior glide Right:;WNL  -AS    Medial glide Right:;WNL  -AS    Lateral glide Right:;WNL  -AS       Right Lower Ext    Rt Knee Extension/Flexion AROM 0-140 degrees  -AS       MMT Right Lower Ext    Rt Hip Flexion MMT, Gross Movement (4-/5) good minus  -AS    Rt Hip Extension MMT, Gross Movement (4-/5)  good minus  -AS    Rt Hip ABduction MMT, Gross Movement (4-/5) good minus  -AS    Rt Knee Extension MMT, Gross Movement (4-/5) good minus  -AS    Rt Knee Flexion MMT, Gross Movement (4-/5) good minus  -AS       Sensation    Sensation WNL? WNL  -AS    Light Touch No apparent deficits  -AS       Lower Extremity Flexibility    Hamstrings Right:;Mildly limited  -AS       Gait/Stairs (Locomotion)    Comment, (Gait/Stairs) Patient ambulates with a cane at this time. Patient continues to ambulate with an antalgic gait pattern with decreased heel strike with shortened step and stride length on right side.  -AS              User Key  (r) = Recorded By, (t) = Taken By, (c) = Cosigned By      Initials Name Provider Type    AS Sheldon Helms, PT Physical Therapist                                       PT OP Goals       Row Name 01/16/25 0500          PT Short Term Goals    STG 1 Patient to demonstrate compliance with initial HEP for flexibility, ROM and strengthening.  -AS     STG 1 Progress Met  -AS     STG 2 Patient to report right knee pain on VAS of 2-3/10 at worst with activity.  -AS     STG 2 Progress Ongoing;Progressing  -AS     STG 3 Patient to demonstrate improved right LE strength to 4/5 in all planes.  -AS     STG 3 Progress Ongoing;Progressing  -AS     STG 4 Patient to demonstrate improved right knee ROM to 0-1-110 degrees.  -AS     STG 4 Progress Partially Met;Ongoing;Progressing  -AS     STG 5 Patient to ambulate short distances without the use of an AD and with an improved gait pattern.  -AS     STG 5 Progress Ongoing;Progressing  -AS        Long Term Goals    LTG 1 Patient to demonstrate compliance with advanced HEP for flexibility, ROM and strengthening.  -AS     LTG 1 Progress Ongoing;Progressing  -AS     LTG 2 Patient to report right knee pain on VAS of 0-1/10 at worst with activity.  -AS     LTG 2 Progress Ongoing;Progressing  -AS     LTG 3 Patient to demonstrate improved right LE strength to 4+/5 in  all planes.  -AS     LTG 3 Progress Ongoing;Progressing  -AS     LTG 4 Patient to demonstrate improved right knee ROM to 0-130 degrees.  -AS     LTG 4 Progress Ongoing;Progressing  -AS     LTG 5 Patient to ambulate community distances without an AD and with a normal gait pattern.  -AS     LTG 5 Progress Ongoing;Progressing  -AS     LTG 6 Patient to report overall improved function on LEFS to 70/80.  -AS     LTG 6 Progress Ongoing;Progressing  -AS               User Key  (r) = Recorded By, (t) = Taken By, (c) = Cosigned By      Initials Name Provider Type    AS Sheldon Helms, PT Physical Therapist                     PT Assessment/Plan       Row Name 01/16/25 0600          PT Assessment    Functional Limitations Impaired gait;Impaired locomotion;Limitation in home management;Limitations in community activities;Performance in leisure activities;Performance in self-care ADL;Performance in sport activities;Performance in work activities  -AS     Impairments Edema;Gait;Impaired flexibility;Muscle strength;Pain;Range of motion  -AS     Assessment Comments Patient continues to demonstrate slow improvements with her strength, function, and gait. Patient continues to have good right knee ROM. Plan to continue to progress patient as tolerated.  -AS     Please refer to paper survey for additional self-reported information Yes  -AS     Rehab Potential Good  -AS     Patient/caregiver participated in establishment of treatment plan and goals Yes  -AS     Patient would benefit from skilled therapy intervention Yes  -AS        PT Plan    PT Frequency 2x/week  -AS     Predicted Duration of Therapy Intervention (PT) 6-8 weeks  -AS     Planned CPT's? PT RE-EVAL: 30676;PT THER PROC EA 15 MIN: 74827;PT THER ACT EA 15 MIN: 42119;PT MANUAL THERAPY EA 15 MIN: 09847;PT NEUROMUSC RE-EDUCATION EA 15 MIN: 73415;PT GAIT TRAINING EA 15 MIN: 69630  -AS     PT Plan Comments Continue with current treatment plan.  -AS               User Key   "(r) = Recorded By, (t) = Taken By, (c) = Cosigned By      Initials Name Provider Type    AS Sheldon Helms, PT Physical Therapist                       OP Exercises       Row Name 01/16/25 0736 01/16/25 0600 01/16/25 0500       Subjective    Subjective Comments -- -- Patient states her knee is \"stiff\".  -AS       Subjective Pain    Able to rate subjective pain? -- yes  -AS --    Pre-Treatment Pain Level -- 5  -AS --    Post-Treatment Pain Level -- 5  -AS --       Total Minutes    08383 - PT Therapeutic Exercise Minutes 60  -AS -- --       Exercise 1    Exercise Name 1 -- -- Heel Slides  -AS    Time 1 -- -- 8 min  -AS       Exercise 2    Exercise Name 2 -- -- Wall Slides  -AS    Time 2 -- -- 8 min  -AS       Exercise 3    Exercise Name 3 -- -- Bike (seat 0)  -AS    Time 3 -- -- 5 min  -AS       Exercise 4    Exercise Name 4 -- -- HS Stretch  -AS    Reps 4 -- -- 10  -AS    Time 4 -- -- 10 sec hold each  -AS       Exercise 5    Exercise Name 5 -- -- PLH  -AS    Time 5 -- -- 5 min  -AS       Exercise 6    Exercise Name 6 -- -- QS vs Cuban  -AS    Time 6 -- -- 10 min, 10/10 co-contract  -AS       Exercise 7    Exercise Name 7 -- -- 4-Way Hip  -AS    Reps 7 -- -- 25 each  -AS    Time 7 -- -- 2#  -AS       Exercise 8    Exercise Name 8 -- -- HS vs Stool  -AS    Reps 8 -- -- 10  -AS       Exercise 9    Exercise Name 9 -- -- LAQ  -AS    Reps 9 -- -- 25  -AS       Exercise 10    Exercise Name 10 -- -- TKE  -AS    Reps 10 -- -- 25  -AS    Time 10 -- -- Silver  -AS       Exercise 11    Exercise Name 11 -- -- Standing HS Curls  -AS    Reps 11 -- -- 10  -AS       Exercise 12    Exercise Name 12 -- -- Mini Squats  -AS    Reps 12 -- -- 25  -AS       Exercise 13    Exercise Name 13 -- -- 6\" FWD Step Ups  -AS    Reps 13 -- -- 15 each leg  -AS       Exercise 14    Exercise Name 14 -- -- Lateral Step Overs  -AS       Exercise 15    Exercise Name 15 -- -- Lateral Dips  -AS       Exercise 16    Exercise Name 16 -- -- 3-Way Cable " Walks  -AS              User Key  (r) = Recorded By, (t) = Taken By, (c) = Cosigned By      Initials Name Provider Type    AS Sheldon Helms, PT Physical Therapist                                            Time Calculation:     Start Time: 0557  Stop Time: 0701  Time Calculation (min): 64 min  Timed Charges  15854 - PT Therapeutic Exercise Minutes: 60  Total Minutes  Timed Charges Total Minutes: 60   Total Minutes: 60     Therapy Charges for Today       Code Description Service Date Service Provider Modifiers Qty    40392368860  PT THER PROC EA 15 MIN 1/16/2025 Sheldon Helms, PT GP 3                      Sheldon Helms, PT  1/16/2025

## 2025-01-17 NOTE — TELEPHONE ENCOUNTER
Order entered for Lyrica 50 mg.  
Patient aware.  
Status post right knee ACL reconstruction with hamstring autograft, medial meniscal repair-12/12/2024     Patient is currently on Lyrica 75mg BID, she is asking if there is a lower dose she could try.  
Osman Mcbride(Attending)

## 2025-01-20 ENCOUNTER — HOSPITAL ENCOUNTER (OUTPATIENT)
Dept: PHYSICAL THERAPY | Facility: HOSPITAL | Age: 45
Setting detail: THERAPIES SERIES
Discharge: HOME | End: 2025-01-20

## 2025-01-20 DIAGNOSIS — S83.231A COMPLEX TEAR OF MEDIAL MENISCUS OF RIGHT KNEE AS CURRENT INJURY, INITIAL ENCOUNTER: ICD-10-CM

## 2025-01-20 DIAGNOSIS — S83.511A RUPTURE OF ANTERIOR CRUCIATE LIGAMENT OF RIGHT KNEE, INITIAL ENCOUNTER: Primary | ICD-10-CM

## 2025-01-20 PROCEDURE — 97110 THERAPEUTIC EXERCISES: CPT

## 2025-01-20 NOTE — THERAPY TREATMENT NOTE
Outpatient Physical Therapy Ortho Treatment Note  [facility]     Patient Name: Becca Fonseca  : 1980  MRN: [id number]  Today's Date: 2025      Visit Date: 2025    Visit Dx:    ICD-10-CM ICD-9-CM   1. Rupture of anterior cruciate ligament of right knee, initial encounter  S83.511A 844.2   2. Complex tear of medial meniscus of right knee as current injury, initial encounter  S83.231A 836.0       Patient Active Problem List   Diagnosis    Rupture of anterior cruciate ligament of right knee    Complex tear of medial meniscus of right knee as current injury    Status post anterior cruciate ligament surgery    Encounter to establish care        Past Medical History:   Diagnosis Date    PONV (postoperative nausea and vomiting)         Past Surgical History:   Procedure Laterality Date    KNEE ACL RECONSTRUCTION Right 2024    Procedure: Knee arthroscopy, anterior cruciate ligament reconstruction with hamstring autograft, possible allograft supplement, meniscal and cartilage surgery as indicated;  Surgeon: David Mortensen MD;  Location: [facility];  Service: Orthopedics;  Laterality: Right;    TUBAL ABDOMINAL LIGATION                          PT Assessment/Plan       Row Name 25 0500          PT Assessment    Assessment Comments Patient continues to demonstrate steady improvement with PT at this time.  -AS        PT Plan    PT Plan Comments Continue with current treatment plan.  -AS               User Key  (r) = Recorded By, (t) = Taken By, (c) = Cosigned By      Initials Name Provider Type    AS Sheldon Helms, PT Physical Therapist                       OP Exercises       Row Name 25 0654 25 0500          Subjective    Subjective Comments -- Patient states her knee is doing better.  -AS        Total Minutes    62951 - PT Therapeutic Exercise Minutes 60  -AS --        Exercise 1    Exercise Name 1 -- Heel Slides  -AS     Time 1 -- 5 min  -AS        Exercise 2     "Exercise Name 2 -- Wall Slides  -AS     Time 2 -- 5 min  -AS        Exercise 3    Exercise Name 3 -- Bike (seat 0)  -AS     Time 3 -- 5 min  -AS        Exercise 4    Exercise Name 4 -- HS Stretch  -AS     Reps 4 -- 10  -AS     Time 4 -- 10 sec hold each  -AS        Exercise 5    Exercise Name 5 -- PLH  -AS     Time 5 -- 5 min - 1#  -AS        Exercise 6    Exercise Name 6 -- QS  -AS     Reps 6 -- 25  -AS     Time 6 -- 5 sec hold each  -AS        Exercise 7    Exercise Name 7 -- 4-Way Hip  -AS     Reps 7 -- 25 each  -AS     Time 7 -- 2#  -AS        Exercise 8    Exercise Name 8 -- HS vs Stool  -AS     Reps 8 -- 15  -AS        Exercise 9    Exercise Name 9 -- LAQ  -AS     Reps 9 -- 25  -AS        Exercise 10    Exercise Name 10 -- TKE  -AS     Reps 10 -- 25  -AS     Time 10 -- Silver  -AS        Exercise 11    Exercise Name 11 -- Standing HS Curls  -AS     Reps 11 -- 15  -AS        Exercise 12    Exercise Name 12 -- Mini Squats  -AS     Reps 12 -- 25  -AS        Exercise 13    Exercise Name 13 -- 6\" FWD Step Ups  -AS     Reps 13 -- 25 each leg  -AS        Exercise 14    Exercise Name 14 -- 6\" Lateral Step Overs  -AS     Reps 14 -- 15  -AS        Exercise 15    Exercise Name 15 -- Lateral Dips  -AS        Exercise 16    Exercise Name 16 -- 3-Way Cable Walks  -AS        Exercise 17    Exercise Name 17 -- Heel Raises  -AS     Reps 17 -- 25  -AS               User Key  (r) = Recorded By, (t) = Taken By, (c) = Cosigned By      Initials Name Provider Type    AS Sheldon Helms, PT Physical Therapist                                                    Time Calculation:   Start Time: 0550  Stop Time: 0654  Time Calculation (min): 64 min  Timed Charges  03216 - PT Therapeutic Exercise Minutes: 60  Total Minutes  Timed Charges Total Minutes: 60   Total Minutes: 60  Therapy Charges for Today       Code Description Service Date Service Provider Modifiers Qty    36889828485  PT THER PROC EA 15 MIN 1/20/2025 Sheldon Helms" Nathan, PT GP 2                      Sheldon Helms, PT  1/20/2025

## 2025-01-23 ENCOUNTER — HOSPITAL ENCOUNTER (OUTPATIENT)
Dept: PHYSICAL THERAPY | Facility: HOSPITAL | Age: 45
Setting detail: THERAPIES SERIES
Discharge: HOME | End: 2025-01-23

## 2025-01-23 DIAGNOSIS — S83.511A RUPTURE OF ANTERIOR CRUCIATE LIGAMENT OF RIGHT KNEE, INITIAL ENCOUNTER: Primary | ICD-10-CM

## 2025-01-23 DIAGNOSIS — S83.231A COMPLEX TEAR OF MEDIAL MENISCUS OF RIGHT KNEE AS CURRENT INJURY, INITIAL ENCOUNTER: ICD-10-CM

## 2025-01-23 PROCEDURE — 97110 THERAPEUTIC EXERCISES: CPT

## 2025-01-23 NOTE — THERAPY TREATMENT NOTE
"  Outpatient Physical Therapy Ortho Treatment Note  [facility]     Patient Name: Becca Fonseca  : 1980  MRN: [id number]  Today's Date: 2025      Visit Date: 2025    Visit Dx:    ICD-10-CM ICD-9-CM   1. Rupture of anterior cruciate ligament of right knee, initial encounter  S83.511A 844.2   2. Complex tear of medial meniscus of right knee as current injury, initial encounter  S83.231A 836.0       Patient Active Problem List   Diagnosis    Rupture of anterior cruciate ligament of right knee    Complex tear of medial meniscus of right knee as current injury    Status post anterior cruciate ligament surgery    Encounter to establish care        Past Medical History:   Diagnosis Date    PONV (postoperative nausea and vomiting)         Past Surgical History:   Procedure Laterality Date    KNEE ACL RECONSTRUCTION Right 2024    Procedure: Knee arthroscopy, anterior cruciate ligament reconstruction with hamstring autograft, possible allograft supplement, meniscal and cartilage surgery as indicated;  Surgeon: David Mortensen MD;  Location: [facility];  Service: Orthopedics;  Laterality: Right;    TUBAL ABDOMINAL LIGATION                          PT Assessment/Plan       Row Name 25 0500          PT Assessment    Assessment Comments Patient continues to demonstrate steady improvement with PT at this time. Plan to continue to progress patient as tolerated.  -AS        PT Plan    PT Plan Comments Continue with current treatment plan.  -AS               User Key  (r) = Recorded By, (t) = Taken By, (c) = Cosigned By      Initials Name Provider Type    AS Sheldon Helms, PT Physical Therapist                       OP Exercises       Row Name 25 0714 25 0500          Subjective    Subjective Comments -- Patient states she is doing well. She states she was sore after her last treatment session. She states she continues to feel a \"stabbing\" pain when she rotates her foot " "when non-weight bearing.  -AS        Total Minutes    72656 - PT Therapeutic Exercise Minutes 70  -AS --        Exercise 1    Exercise Name 1 -- Heel Slides  -AS     Time 1 -- 5 min  -AS        Exercise 2    Exercise Name 2 -- Wall Slides  -AS     Time 2 -- 5 min  -AS        Exercise 3    Exercise Name 3 -- Bike (seat 0)  -AS     Time 3 -- 5 min  -AS        Exercise 4    Exercise Name 4 -- HS Stretch  -AS     Reps 4 -- 10  -AS     Time 4 -- 10 sec hold each  -AS        Exercise 5    Exercise Name 5 -- PLH  -AS     Time 5 -- 5 min - 2#  -AS        Exercise 6    Exercise Name 6 -- QS  -AS     Reps 6 -- 25  -AS     Time 6 -- 5 sec hold each  -AS        Exercise 7    Exercise Name 7 -- 4-Way Hip  -AS     Reps 7 -- 25 each  -AS     Time 7 -- 2#  -AS        Exercise 8    Exercise Name 8 -- HS vs Stool  -AS     Reps 8 -- 25  -AS        Exercise 9    Exercise Name 9 -- LAQ  -AS     Reps 9 -- 25  -AS        Exercise 10    Exercise Name 10 -- TKE  -AS     Reps 10 -- 25  -AS     Time 10 -- Silver  -AS        Exercise 11    Exercise Name 11 -- Standing HS Curls  -AS     Reps 11 -- 25  -AS        Exercise 12    Exercise Name 12 -- Mini Squats  -AS     Reps 12 -- 25  -AS        Exercise 13    Exercise Name 13 -- 6\" FWD Step Ups  -AS     Reps 13 -- 25 each leg  -AS        Exercise 14    Exercise Name 14 -- 6\" Lateral Step Overs  -AS     Reps 14 -- 15  -AS        Exercise 15    Exercise Name 15 -- Lateral Dips  -AS        Exercise 16    Exercise Name 16 -- Heel Raises  -AS     Reps 16 -- 25  -AS        Exercise 17    Exercise Name 17 -- 3-Way Cable Walks  -AS     Reps 17 -- --  -AS               User Key  (r) = Recorded By, (t) = Taken By, (c) = Cosigned By      Initials Name Provider Type    AS Sheldon Helms, PT Physical Therapist                                                    Time Calculation:   Start Time: 0552  Stop Time: 0705  Time Calculation (min): 73 min  Timed Charges  21535 - PT Therapeutic Exercise Minutes: " 70  Total Minutes  Timed Charges Total Minutes: 70   Total Minutes: 70  Therapy Charges for Today       Code Description Service Date Service Provider Modifiers Qty    26772321195 HC PT THER PROC EA 15 MIN 1/23/2025 Sheldon Helms, PT GP 3                      Sheldon Helms, PT  1/23/2025

## 2025-01-27 ENCOUNTER — APPOINTMENT (OUTPATIENT)
Dept: PHYSICAL THERAPY | Facility: HOSPITAL | Age: 45
End: 2025-01-27

## 2025-01-30 ENCOUNTER — HOSPITAL ENCOUNTER (OUTPATIENT)
Dept: PHYSICAL THERAPY | Facility: HOSPITAL | Age: 45
Setting detail: THERAPIES SERIES
Discharge: HOME | End: 2025-01-30

## 2025-01-30 DIAGNOSIS — S83.511A RUPTURE OF ANTERIOR CRUCIATE LIGAMENT OF RIGHT KNEE, INITIAL ENCOUNTER: Primary | ICD-10-CM

## 2025-01-30 DIAGNOSIS — S83.231A COMPLEX TEAR OF MEDIAL MENISCUS OF RIGHT KNEE AS CURRENT INJURY, INITIAL ENCOUNTER: ICD-10-CM

## 2025-01-30 PROCEDURE — 97110 THERAPEUTIC EXERCISES: CPT

## 2025-01-30 NOTE — THERAPY TREATMENT NOTE
"  Outpatient Physical Therapy Ortho Treatment Note  [facility]     Patient Name: Becca Fonseca  : 1980  MRN: [id number]  Today's Date: 2025      Visit Date: 2025    Visit Dx:    ICD-10-CM ICD-9-CM   1. Rupture of anterior cruciate ligament of right knee, initial encounter  S83.511A 844.2   2. Complex tear of medial meniscus of right knee as current injury, initial encounter  S83.231A 836.0       Patient Active Problem List   Diagnosis    Rupture of anterior cruciate ligament of right knee    Complex tear of medial meniscus of right knee as current injury    Status post anterior cruciate ligament surgery    Encounter to establish care        Past Medical History:   Diagnosis Date    PONV (postoperative nausea and vomiting)         Past Surgical History:   Procedure Laterality Date    KNEE ACL RECONSTRUCTION Right 2024    Procedure: Knee arthroscopy, anterior cruciate ligament reconstruction with hamstring autograft, possible allograft supplement, meniscal and cartilage surgery as indicated;  Surgeon: David Mortensen MD;  Location: [facility];  Service: Orthopedics;  Laterality: Right;    TUBAL ABDOMINAL LIGATION                          PT Assessment/Plan       Row Name 25 0500          PT Assessment    Assessment Comments Patient ambulates into the clinic today without the use of an AD. Patient continues to have good right knee ROM and her strength and function continues to improve. Plan to continue to progress patient as tolerated.  -AS        PT Plan    PT Plan Comments Continue with current treatment plan.  -AS               User Key  (r) = Recorded By, (t) = Taken By, (c) = Cosigned By      Initials Name Provider Type    AS Sheldon Helms, PT Physical Therapist                       OP Exercises       Row Name 25 0701 25 0500          Subjective    Subjective Comments -- Patient states she is doing well and is \"doing so much better.\" She states it " "feels like \"something just clicked.\" She states she is went [place] this past [place] and did a lot of walking but she feels she did well.  -AS        Total Minutes    01100 - PT Therapeutic Exercise Minutes 60  -AS --        Exercise 1    Exercise Name 1 -- Heel Slides  -AS     Time 1 -- 5 min  -AS        Exercise 2    Exercise Name 2 -- Wall Slides  -AS     Time 2 -- 5 min  -AS        Exercise 3    Exercise Name 3 -- Bike (seat 0)  -AS     Time 3 -- 5 min  -AS        Exercise 4    Exercise Name 4 -- HS Stretch  -AS     Reps 4 -- 10  -AS     Time 4 -- 10 sec hold each  -AS        Exercise 5    Exercise Name 5 -- PLH  -AS     Time 5 -- 5 min - 3#  -AS        Exercise 6    Exercise Name 6 -- QS  -AS     Reps 6 -- 25  -AS     Time 6 -- 5 sec hold each  -AS        Exercise 7    Exercise Name 7 -- 4-Way Hip  -AS     Reps 7 -- 25 each  -AS     Time 7 -- 2#  -AS        Exercise 8    Exercise Name 8 -- HS vs Stool  -AS     Reps 8 -- 25  -AS        Exercise 9    Exercise Name 9 -- LAQ  -AS     Reps 9 -- 25  -AS        Exercise 10    Exercise Name 10 -- TKE  -AS     Reps 10 -- 25  -AS     Time 10 -- Silver  -AS        Exercise 11    Exercise Name 11 -- Standing HS Curls  -AS     Reps 11 -- 25  -AS        Exercise 12    Exercise Name 12 -- Mini Squats  -AS     Reps 12 -- 25  -AS        Exercise 13    Exercise Name 13 -- 6\" FWD Step Ups  -AS     Reps 13 -- 25 each leg  -AS        Exercise 14    Exercise Name 14 -- 6\" Lateral Step Overs  -AS     Reps 14 -- 25  -AS        Exercise 15    Exercise Name 15 -- Lateral Dips  -AS        Exercise 16    Exercise Name 16 -- Heel Raises  -AS     Reps 16 -- 25  -AS        Exercise 17    Exercise Name 17 -- 3-Way Cable Walks  -AS               User Key  (r) = Recorded By, (t) = Taken By, (c) = Cosigned By      Initials Name Provider Type    AS Sheldon Helms, PT Physical Therapist                                                    Time Calculation: "   Start Time: 0555  Stop Time: 0658  Time Calculation (min): 63 min  Timed Charges  32359 - PT Therapeutic Exercise Minutes: 60  Total Minutes  Timed Charges Total Minutes: 60   Total Minutes: 60  Therapy Charges for Today       Code Description Service Date Service Provider Modifiers Qty    60087999657 HC PT THER PROC EA 15 MIN 1/30/2025 Shelodn Helms, PT GP 2                      Sheldon Helms, PT  1/30/2025

## 2025-01-31 DIAGNOSIS — Z98.890 STATUS POST ANTERIOR CRUCIATE LIGAMENT SURGERY: ICD-10-CM

## 2025-01-31 RX ORDER — HYDROCODONE BITARTRATE AND ACETAMINOPHEN 5; 325 MG/1; MG/1
1 TABLET ORAL EVERY 4 HOURS PRN
Qty: 50 TABLET | Refills: 0 | Status: SHIPPED | OUTPATIENT
Start: 2025-01-31

## 2025-01-31 RX ORDER — PREGABALIN 50 MG/1
50 CAPSULE ORAL 3 TIMES DAILY
Qty: 90 CAPSULE | Refills: 0 | Status: SHIPPED | OUTPATIENT
Start: 2025-01-31

## 2025-01-31 NOTE — TELEPHONE ENCOUNTER
Rx Refill Note  Requested Prescriptions      No prescriptions requested or ordered in this encounter      Last office visit with prescribing clinician: 1/8/2025      Next office visit with prescribing clinician: 2/5/2025   Last Filled:1/16/25    No diagnosis found.   Date of Surgery:Status post right knee ACL reconstruction with hamstring autograft, medial meniscal repair-12/12/2024         Shelley Esparza MA  01/31/25, 09:19 EST    Previous RX pended for your approval, change or denial.     {TIP  Encounters:    {TIP  Please add Last Relevant Lab Date if appropriate:  {TIP  Is Refill Pharmacy correct?:

## 2025-02-03 ENCOUNTER — HOSPITAL ENCOUNTER (OUTPATIENT)
Dept: PHYSICAL THERAPY | Facility: HOSPITAL | Age: 45
Setting detail: THERAPIES SERIES
Discharge: HOME | End: 2025-02-03

## 2025-02-03 DIAGNOSIS — S83.511A RUPTURE OF ANTERIOR CRUCIATE LIGAMENT OF RIGHT KNEE, INITIAL ENCOUNTER: Primary | ICD-10-CM

## 2025-02-03 DIAGNOSIS — S83.231A COMPLEX TEAR OF MEDIAL MENISCUS OF RIGHT KNEE AS CURRENT INJURY, INITIAL ENCOUNTER: ICD-10-CM

## 2025-02-03 PROCEDURE — 97110 THERAPEUTIC EXERCISES: CPT

## 2025-02-03 NOTE — THERAPY TREATMENT NOTE
Outpatient Physical Therapy Ortho Treatment Note  [facility]     Patient Name: Becca Fonseca  : 1980  MRN: [id number]  Today's Date: 2/3/2025      Visit Date: 2025    Visit Dx:    ICD-10-CM ICD-9-CM   1. Rupture of anterior cruciate ligament of right knee, initial encounter  S83.511A 844.2   2. Complex tear of medial meniscus of right knee as current injury, initial encounter  S83.231A 836.0       Patient Active Problem List   Diagnosis    Rupture of anterior cruciate ligament of right knee    Complex tear of medial meniscus of right knee as current injury    Status post anterior cruciate ligament surgery    Encounter to establish care        Past Medical History:   Diagnosis Date    PONV (postoperative nausea and vomiting)         Past Surgical History:   Procedure Laterality Date    KNEE ACL RECONSTRUCTION Right 2024    Procedure: Knee arthroscopy, anterior cruciate ligament reconstruction with hamstring autograft, possible allograft supplement, meniscal and cartilage surgery as indicated;  Surgeon: David Mortensen MD;  Location: [facility];  Service: Orthopedics;  Laterality: Right;    TUBAL ABDOMINAL LIGATION                          PT Assessment/Plan       Row Name 25 0600          PT Assessment    Assessment Comments Patient continues to do improve and do well with PT at this time. Plan to continue to progress patient as tolerated.  -AS        PT Plan    PT Plan Comments Continue with current treatment plan.  -AS               User Key  (r) = Recorded By, (t) = Taken By, (c) = Cosigned By      Initials Name Provider Type    AS Sheldon Helms, PT Physical Therapist                       OP Exercises       Row Name 25 0703 25 0600          Subjective    Subjective Comments -- Patient states her knee is doing well and continues to improve.  -AS        Total Minutes    25995 - PT Therapeutic Exercise Minutes 60  -AS --        Exercise 1    Exercise Name 1  "-- Heel Slides  -AS     Time 1 -- 5 min  -AS        Exercise 2    Exercise Name 2 -- Wall Slides  -AS     Time 2 -- 5 min  -AS        Exercise 3    Exercise Name 3 -- Bike (seat 0)  -AS     Time 3 -- 5 min  -AS        Exercise 4    Exercise Name 4 -- HS Stretch  -AS     Reps 4 -- 10  -AS     Time 4 -- 10 sec hold each  -AS        Exercise 5    Exercise Name 5 -- PLH  -AS     Time 5 -- 5 min - 4#  -AS        Exercise 6    Exercise Name 6 -- QS  -AS     Reps 6 -- 25  -AS     Time 6 -- 5 sec hold each  -AS        Exercise 7    Exercise Name 7 -- 4-Way Hip  -AS     Reps 7 -- 25 each  -AS     Time 7 -- 2#  -AS        Exercise 8    Exercise Name 8 -- HS vs Stool  -AS     Reps 8 -- 25  -AS        Exercise 9    Exercise Name 9 -- LAQ  -AS     Reps 9 -- 25  -AS        Exercise 10    Exercise Name 10 -- TKE  -AS     Reps 10 -- 25  -AS     Time 10 -- Silver  -AS        Exercise 11    Exercise Name 11 -- Standing HS Curls  -AS     Reps 11 -- 25  -AS        Exercise 12    Exercise Name 12 -- Mini Squats  -AS     Reps 12 -- 25  -AS        Exercise 13    Exercise Name 13 -- 6\" FWD Step Ups  -AS     Reps 13 -- 25 each leg  -AS        Exercise 14    Exercise Name 14 -- 6\" Lateral Step Overs  -AS     Reps 14 -- 25  -AS        Exercise 15    Exercise Name 15 -- Lateral Dips  -AS        Exercise 16    Exercise Name 16 -- Heel Raises  -AS     Reps 16 -- 25  -AS        Exercise 17    Exercise Name 17 -- 3-Way Cable Walks  -AS               User Key  (r) = Recorded By, (t) = Taken By, (c) = Cosigned By      Initials Name Provider Type    AS Sheldon Helms, PT Physical Therapist                                                    Time Calculation:   Start Time: 0600  Stop Time: 0703  Time Calculation (min): 63 min  Timed Charges  63611 - PT Therapeutic Exercise Minutes: 60  Total Minutes  Timed Charges Total Minutes: 60   Total Minutes: 60  Therapy Charges for Today       Code Description Service Date Service Provider Modifiers Qty "    07854022687  PT THER PROC EA 15 MIN 2/3/2025 Sheldon Helms, PT GP 2                      Sheldon Helms, PT  2/3/2025

## 2025-02-05 ENCOUNTER — OFFICE VISIT (OUTPATIENT)
Dept: ORTHOPEDIC SURGERY | Facility: CLINIC | Age: 45
End: 2025-02-05

## 2025-02-05 VITALS — WEIGHT: 151 LBS | BODY MASS INDEX: 23.7 KG/M2 | HEIGHT: 67 IN

## 2025-02-05 DIAGNOSIS — Z98.890 STATUS POST ANTERIOR CRUCIATE LIGAMENT SURGERY: Primary | ICD-10-CM

## 2025-02-05 NOTE — PROGRESS NOTES
CC:Status post right knee ACL reconstruction with hamstring autograft, medial meniscal repair-12/12/2024     Interval history: Patient returns to clinic today, states overall doing very well with her right knee.  Motion is improving significantly, continuing to notice good improvement with physical therapy.  Mild incisional tenderness over anterior medial incision on proximal tibia.  No fevers chills or sweats.  No significant swelling.  Denies any buckling catching or locking episodes.     Physical exam:               Right knee:              Active range of motion 0 to 130 degrees, 4+ out of 5 strength on flexion and extension, no extensor lag, minimal effusion.  Incisions well-healed.  Grade 1A Lachman, negative anterior posterior drawer.  No joint line pain.              Positive sensation light touch               Brisk cap refill     Impression: Status post right knee ACL reconstruction, medial meniscal repair     Plan:  Becca is progressing well this point in time, discontinue hinged knee brace and transition to a knee sleeve.  Recommend soft tissue massage for incisional tenderness.  Continue with therapy per protocol.  Follow-up in 2 months repeat x-rays ACL series right knee at that time.

## 2025-02-06 ENCOUNTER — HOSPITAL ENCOUNTER (OUTPATIENT)
Dept: PHYSICAL THERAPY | Facility: HOSPITAL | Age: 45
Setting detail: THERAPIES SERIES
Discharge: HOME | End: 2025-02-06

## 2025-02-06 DIAGNOSIS — S83.511A RUPTURE OF ANTERIOR CRUCIATE LIGAMENT OF RIGHT KNEE, INITIAL ENCOUNTER: Primary | ICD-10-CM

## 2025-02-06 DIAGNOSIS — S83.231A COMPLEX TEAR OF MEDIAL MENISCUS OF RIGHT KNEE AS CURRENT INJURY, INITIAL ENCOUNTER: ICD-10-CM

## 2025-02-06 PROCEDURE — 97110 THERAPEUTIC EXERCISES: CPT

## 2025-02-06 NOTE — THERAPY TREATMENT NOTE
"  Outpatient Physical Therapy Ortho Treatment Note  [facility]     Patient Name: Becca Fonseca  : 1980  MRN: [id number]  Today's Date: 2025      Visit Date: 2025    Visit Dx:    ICD-10-CM ICD-9-CM   1. Rupture of anterior cruciate ligament of right knee, initial encounter  S83.511A 844.2   2. Complex tear of medial meniscus of right knee as current injury, initial encounter  S83.231A 836.0       Patient Active Problem List   Diagnosis    Rupture of anterior cruciate ligament of right knee    Complex tear of medial meniscus of right knee as current injury    Status post anterior cruciate ligament surgery    Encounter to establish care        Past Medical History:   Diagnosis Date    PONV (postoperative nausea and vomiting)         Past Surgical History:   Procedure Laterality Date    KNEE ACL RECONSTRUCTION Right 2024    Procedure: Knee arthroscopy, anterior cruciate ligament reconstruction with hamstring autograft, possible allograft supplement, meniscal and cartilage surgery as indicated;  Surgeon: David Mortensen MD;  Location: [facility];  Service: Orthopedics;  Laterality: Right;    TUBAL ABDOMINAL LIGATION                          PT Assessment/Plan       Row Name 25 06          PT Assessment    Assessment Comments Patient presents to PT today without the use of a knee brace. Patient continues to do improve and do well with PT at this time. Plan to continue to progress patient as tolerated.  -AS        PT Plan    PT Plan Comments Continue with current treatment plan.  -AS               User Key  (r) = Recorded By, (t) = Taken By, (c) = Cosigned By      Initials Name Provider Type    AS Sheldon Helms, PT Physical Therapist                       OP Exercises       Row Name 25 0656 25 06          Subjective    Subjective Comments -- Patient states she is \"feeling great.\" She states her Ortho is pleased with her progress. She states her knee brace has " "been D/C and she has been released to drive. She states she is scheduled to follow up with her Ortho in 2 months.  -AS        Total Minutes    18458 - PT Therapeutic Exercise Minutes 55  -AS --        Exercise 1    Exercise Name 1 -- Heel Slides  -AS     Time 1 -- --  -AS        Exercise 2    Exercise Name 2 -- Wall Slides  -AS     Time 2 -- --  -AS        Exercise 3    Exercise Name 3 -- Bike (seat 0)  -AS     Time 3 -- 5 min  -AS        Exercise 4    Exercise Name 4 -- HS Stretch  -AS     Reps 4 -- 10  -AS     Time 4 -- 10 sec hold each  -AS        Exercise 5    Exercise Name 5 -- PLH  -AS     Time 5 -- 5 min - 3#  -AS        Exercise 6    Exercise Name 6 -- QS  -AS     Reps 6 -- 25  -AS     Time 6 -- 5 sec hold each  -AS        Exercise 7    Exercise Name 7 -- 4-Way Hip  -AS     Reps 7 -- 25 each  -AS     Time 7 -- 2#  -AS        Exercise 8    Exercise Name 8 -- HS vs Stool  -AS     Reps 8 -- 25  -AS        Exercise 9    Exercise Name 9 -- LAQ  -AS     Reps 9 -- 25  -AS        Exercise 10    Exercise Name 10 -- TKE  -AS     Reps 10 -- 25  -AS     Time 10 -- Silver  -AS        Exercise 11    Exercise Name 11 -- Standing HS Curls  -AS     Reps 11 -- 25  -AS        Exercise 12    Exercise Name 12 -- Mini Squats  -AS     Reps 12 -- 25  -AS        Exercise 13    Exercise Name 13 -- 6\" FWD Step Ups  -AS     Reps 13 -- 25 each leg  -AS        Exercise 14    Exercise Name 14 -- 6\" Lateral Step Overs  -AS     Reps 14 -- 25  -AS        Exercise 15    Exercise Name 15 -- 2\" Lateral Dips  -AS     Reps 15 -- 25  -AS        Exercise 16    Exercise Name 16 -- Heel Raises  -AS     Reps 16 -- 25  -AS        Exercise 17    Exercise Name 17 -- 3-Way Cable Walks  -AS               User Key  (r) = Recorded By, (t) = Taken By, (c) = Cosigned By      Initials Name Provider Type    AS Sheldon Helms, PT Physical Therapist                                                    Time Calculation:   Start Time: 0600  Stop Time: " 0656  Time Calculation (min): 56 min  Timed Charges  65664 - PT Therapeutic Exercise Minutes: 55  Total Minutes  Timed Charges Total Minutes: 55   Total Minutes: 55  Therapy Charges for Today       Code Description Service Date Service Provider Modifiers Qty    47033378959 HC PT THER PROC EA 15 MIN 2/6/2025 Sheldon Helms, PT GP 2                      Sheldon Helms, PT  2/6/2025

## 2025-02-10 ENCOUNTER — HOSPITAL ENCOUNTER (OUTPATIENT)
Dept: PHYSICAL THERAPY | Facility: HOSPITAL | Age: 45
Setting detail: THERAPIES SERIES
Discharge: HOME | End: 2025-02-10

## 2025-02-10 DIAGNOSIS — S83.231A COMPLEX TEAR OF MEDIAL MENISCUS OF RIGHT KNEE AS CURRENT INJURY, INITIAL ENCOUNTER: ICD-10-CM

## 2025-02-10 DIAGNOSIS — S83.511A RUPTURE OF ANTERIOR CRUCIATE LIGAMENT OF RIGHT KNEE, INITIAL ENCOUNTER: Primary | ICD-10-CM

## 2025-02-10 PROCEDURE — 97110 THERAPEUTIC EXERCISES: CPT

## 2025-02-10 NOTE — THERAPY TREATMENT NOTE
"  Outpatient Physical Therapy Ortho Treatment Note  [facility]     Patient Name: Becca Fonseca  : 1980  MRN: [id number]  Today's Date: 2/10/2025      Visit Date: 02/10/2025    Visit Dx:    ICD-10-CM ICD-9-CM   1. Rupture of anterior cruciate ligament of right knee, initial encounter  S83.511A 844.2   2. Complex tear of medial meniscus of right knee as current injury, initial encounter  S83.231A 836.0       Patient Active Problem List   Diagnosis    Rupture of anterior cruciate ligament of right knee    Complex tear of medial meniscus of right knee as current injury    Status post anterior cruciate ligament surgery    Encounter to establish care        Past Medical History:   Diagnosis Date    PONV (postoperative nausea and vomiting)         Past Surgical History:   Procedure Laterality Date    KNEE ACL RECONSTRUCTION Right 2024    Procedure: Knee arthroscopy, anterior cruciate ligament reconstruction with hamstring autograft, possible allograft supplement, meniscal and cartilage surgery as indicated;  Surgeon: David Mortensen MD;  Location: [facility];  Service: Orthopedics;  Laterality: Right;    TUBAL ABDOMINAL LIGATION                          PT Assessment/Plan       Row Name 02/10/25 06          PT Assessment    Assessment Comments Patient continues to tolerate progressions in her strengthening exercises. Patient reporting a \"stabbibg\" pain in right knee today. She was able to complete all exercises but had additional discomfort with standing hamstring curls. Plan to continue to progress patient as tolerated.  -AS        PT Plan    PT Plan Comments Continue with current treatment plan.  -AS               User Key  (r) = Recorded By, (t) = Taken By, (c) = Cosigned By      Initials Name Provider Type    AS Sheldon Helms, PT Physical Therapist                       OP Exercises       Row Name 02/10/25 0710 02/10/25 06          Subjective    Subjective Comments -- Patient states " "she is having some \"stabbing\" pain in her knee. She states the pain \"comes and goes.\" Patient states this pain has just started this morning.  -AS        Total Minutes    73586 - PT Therapeutic Exercise Minutes 60  -AS --        Exercise 1    Exercise Name 1 -- Heel Slides  -AS        Exercise 2    Exercise Name 2 -- Wall Slides  -AS        Exercise 3    Exercise Name 3 -- Bike (seat 0)  -AS     Time 3 -- 5 min  -AS        Exercise 4    Exercise Name 4 -- HS Stretch  -AS     Reps 4 -- 10  -AS     Time 4 -- 10 sec hold each  -AS        Exercise 5    Exercise Name 5 -- PLH  -AS     Time 5 -- 5 min - 3#  -AS        Exercise 6    Exercise Name 6 -- QS  -AS     Reps 6 -- 25  -AS     Time 6 -- 5 sec hold each  -AS        Exercise 7    Exercise Name 7 -- 4-Way Hip  -AS     Reps 7 -- 25 each  -AS     Time 7 -- 2#  -AS        Exercise 8    Exercise Name 8 -- HS vs Stool  -AS     Reps 8 -- 25  -AS        Exercise 9    Exercise Name 9 -- LAQ  -AS     Reps 9 -- 25  -AS        Exercise 10    Exercise Name 10 -- TKE  -AS     Reps 10 -- 25  -AS     Time 10 -- Gold  -AS        Exercise 11    Exercise Name 11 -- Standing HS Curls  -AS     Reps 11 -- 25  -AS        Exercise 12    Exercise Name 12 -- Mini Squats  -AS     Reps 12 -- 25  -AS        Exercise 13    Exercise Name 13 -- 6\" FWD Step Ups  -AS     Reps 13 -- 25 each leg  -AS        Exercise 14    Exercise Name 14 -- 6\" Lateral Step Overs  -AS     Reps 14 -- 25  -AS        Exercise 15    Exercise Name 15 -- 2\" Lateral Dips  -AS     Reps 15 -- 25  -AS        Exercise 16    Exercise Name 16 -- Heel Raises  -AS     Reps 16 -- 25  -AS        Exercise 17    Exercise Name 17 -- 3-Way Cable Walks  -AS        Exercise 18    Exercise Name 18 -- Prone Quad Stretch  -AS     Reps 18 -- 10  -AS     Time 18 -- 10 sec hold each  -AS               User Key  (r) = Recorded By, (t) = Taken By, (c) = Cosigned By      Initials Name Provider Type    AS Sheldon Helms, PT Physical Therapist "                                                    Time Calculation:   Start Time: 0605  Stop Time: 0710  Time Calculation (min): 65 min  Timed Charges  08983 - PT Therapeutic Exercise Minutes: 60  Total Minutes  Timed Charges Total Minutes: 60   Total Minutes: 60  Therapy Charges for Today       Code Description Service Date Service Provider Modifiers Qty    88622586074 HC PT THER PROC EA 15 MIN 2/10/2025 Sheldon Helms, PT GP 2                      Sheldon Helms, PT  2/10/2025

## 2025-02-20 ENCOUNTER — HOSPITAL ENCOUNTER (OUTPATIENT)
Dept: PHYSICAL THERAPY | Facility: HOSPITAL | Age: 45
Setting detail: THERAPIES SERIES
Discharge: HOME | End: 2025-02-20

## 2025-02-20 DIAGNOSIS — S83.231A COMPLEX TEAR OF MEDIAL MENISCUS OF RIGHT KNEE AS CURRENT INJURY, INITIAL ENCOUNTER: ICD-10-CM

## 2025-02-20 DIAGNOSIS — S83.511A RUPTURE OF ANTERIOR CRUCIATE LIGAMENT OF RIGHT KNEE, INITIAL ENCOUNTER: Primary | ICD-10-CM

## 2025-02-20 PROCEDURE — 97110 THERAPEUTIC EXERCISES: CPT

## 2025-02-20 NOTE — THERAPY RE-EVALUATION
Outpatient Physical Therapy Ortho Re-Evaluation   [facility]     Patient Name: Becca Fonseca  : 1980  MRN: [id number]  Today's Date: 2025      Visit Date: 2025    Patient Active Problem List   Diagnosis    Rupture of anterior cruciate ligament of right knee    Complex tear of medial meniscus of right knee as current injury    Status post anterior cruciate ligament surgery    Encounter to establish care        Past Medical History:   Diagnosis Date    PONV (postoperative nausea and vomiting)         Past Surgical History:   Procedure Laterality Date    KNEE ACL RECONSTRUCTION Right 2024    Procedure: Knee arthroscopy, anterior cruciate ligament reconstruction with hamstring autograft, possible allograft supplement, meniscal and cartilage surgery as indicated;  Surgeon: David Mortensen MD;  Location: [facility];  Service: Orthopedics;  Laterality: Right;    TUBAL ABDOMINAL LIGATION         Visit Dx:     ICD-10-CM ICD-9-CM   1. Rupture of anterior cruciate ligament of right knee, initial encounter  S83.511A 844.2   2. Complex tear of medial meniscus of right knee as current injury, initial encounter  S83.231A 836.0              PT Ortho       Row Name 25 0600       Precautions and Contraindications    Precautions/Limitations no known precautions/limitations  -AS       Subjective Pain    Able to rate subjective pain? yes  -AS    Pre-Treatment Pain Level 0  -AS    Post-Treatment Pain Level 0  -AS       Posture/Observations    Posture- WNL Posture is WNL  -AS    Observations Incision healing;Muscle atrophy  -AS       Patellar Accessory Motions    Superior glide Right:;WNL  -AS    Inferior glide Right:;WNL  -AS    Medial glide Right:;WNL  -AS    Lateral glide Right:;WNL  -AS       Right Lower Ext    Rt Knee Extension/Flexion AROM 0-145 degrees  -AS       MMT Right Lower Ext    Rt Hip Flexion MMT, Gross Movement (4/5) good  -AS    Rt Hip Extension MMT, Gross Movement (4/5) good   -AS    Rt Hip ABduction MMT, Gross Movement (4/5) good  -AS    Rt Knee Extension MMT, Gross Movement (4/5) good  -AS    Rt Knee Flexion MMT, Gross Movement (4-/5) good minus  -AS       Sensation    Sensation WNL? WNL  -AS    Light Touch No apparent deficits  -AS       Lower Extremity Flexibility    Hamstrings Right:;WNL  -AS       Gait/Stairs (Locomotion)    Comment, (Gait/Stairs) Patient continues to ambulate with the use of a cane. Patient ambulates with a shaortened step and stride length on her right side.  -AS              User Key  (r) = Recorded By, (t) = Taken By, (c) = Cosigned By      Initials Name Provider Type    AS Sheldon Helms, PT Physical Therapist                                       PT OP Goals       Row Name 02/20/25 0600          PT Short Term Goals    STG 1 Patient to demonstrate compliance with initial HEP for flexibility, ROM and strengthening.  -AS     STG 1 Progress Met  -AS     STG 2 Patient to report right knee pain on VAS of 2-3/10 at worst with activity.  -AS     STG 2 Progress Ongoing;Progressing  -AS     STG 3 Patient to demonstrate improved right LE strength to 4/5 in all planes.  -AS     STG 3 Progress Partially Met;Ongoing;Progressing  -AS     STG 4 Patient to demonstrate improved right knee ROM to 0-1-110 degrees.  -AS     STG 4 Progress Met  -AS     STG 5 Patient to ambulate short distances without the use of an AD and with an improved gait pattern.  -AS     STG 5 Progress Ongoing;Progressing  -AS        Long Term Goals    LTG 1 Patient to demonstrate compliance with advanced HEP for flexibility, ROM and strengthening.  -AS     LTG 1 Progress Met  -AS     LTG 2 Patient to report right knee pain on VAS of 0-1/10 at worst with activity.  -AS     LTG 2 Progress Ongoing;Progressing  -AS     LTG 3 Patient to demonstrate improved right LE strength to 4+/5 in all planes.  -AS     LTG 3 Progress Ongoing;Progressing  -AS     LTG 4 Patient to demonstrate improved right knee ROM to  0-130 degrees.  -AS     LTG 4 Progress Met  -AS     LTG 5 Patient to ambulate community distances without an AD and with a normal gait pattern.  -AS     LTG 5 Progress Ongoing;Progressing  -AS     LTG 6 Patient to report overall improved function on LEFS to 70/80.  -AS     LTG 6 Progress Ongoing;Progressing  -AS               User Key  (r) = Recorded By, (t) = Taken By, (c) = Cosigned By      Initials Name Provider Type    AS Sheldon Helms, PT Physical Therapist                     PT Assessment/Plan       Row Name 02/20/25 0600          PT Assessment    Functional Limitations Impaired gait;Impaired locomotion;Limitation in home management;Limitations in community activities;Performance in leisure activities;Performance in sport activities;Performance in work activities  -AS     Impairments Gait;Impaired flexibility;Muscle strength;Pain  -AS     Assessment Comments Patient continues to do well with PT. She is able to ambulate community distances without the use of an AD, however she is using a cane today due to the snow outside. Patient has great right knee ROM and improving strength and function. Plan to continue to progress patient as tolerated.  -AS     Please refer to paper survey for additional self-reported information Yes  -AS     Rehab Potential Good  -AS     Patient/caregiver participated in establishment of treatment plan and goals Yes  -AS     Patient would benefit from skilled therapy intervention Yes  -AS        PT Plan    PT Frequency 1x/week  -AS     Predicted Duration of Therapy Intervention (PT) 6-8 weeks  -AS     Planned CPT's? PT RE-EVAL: 15173;PT THER PROC EA 15 MIN: 77792;PT THER ACT EA 15 MIN: 85579;PT MANUAL THERAPY EA 15 MIN: 57973;PT NEUROMUSC RE-EDUCATION EA 15 MIN: 25678;PT GAIT TRAINING EA 15 MIN: 92669  -AS     PT Plan Comments Continue with current treatment plan.  -AS               User Key  (r) = Recorded By, (t) = Taken By, (c) = Cosigned By      Initials Name Provider Type  "   AS Sheldon Helms, PT Physical Therapist                       OP Exercises       Row Name 02/20/25 0711 02/20/25 0600          Subjective    Subjective Comments -- Patient states her knee is doing better.  -AS        Subjective Pain    Able to rate subjective pain? -- yes  -AS     Pre-Treatment Pain Level -- 0  -AS     Post-Treatment Pain Level -- 0  -AS        Total Minutes    07206 - PT Therapeutic Exercise Minutes 60  -AS --        Exercise 1    Exercise Name 1 -- Heel Slides  -AS     Time 1 -- 3 min  -AS        Exercise 2    Exercise Name 2 -- Wall Slides  -AS        Exercise 3    Exercise Name 3 -- Bike (seat 0)  -AS     Time 3 -- 5 min  -AS        Exercise 4    Exercise Name 4 -- HS Stretch  -AS     Reps 4 -- 10  -AS     Time 4 -- 10 sec hold each  -AS        Exercise 5    Exercise Name 5 -- PLH  -AS     Time 5 -- 5 min - 3#  -AS        Exercise 6    Exercise Name 6 -- QS  -AS     Reps 6 -- 25  -AS     Time 6 -- 5 sec hold each  -AS        Exercise 7    Exercise Name 7 -- 4-Way Hip  -AS     Reps 7 -- 25 each  -AS     Time 7 -- 3#  -AS        Exercise 8    Exercise Name 8 -- Single Leg HS vs Stool  -AS     Reps 8 -- 25  -AS        Exercise 9    Exercise Name 9 -- LAQ  -AS     Reps 9 -- 25  -AS        Exercise 10    Exercise Name 10 -- TKE  -AS     Reps 10 -- 25  -AS     Time 10 -- Gold  -AS        Exercise 11    Exercise Name 11 -- Standing HS Curls  -AS     Reps 11 -- 25  -AS        Exercise 12    Exercise Name 12 -- Mini Squats  -AS     Reps 12 -- 25  -AS        Exercise 13    Exercise Name 13 -- 6\" FWD Step Ups  -AS     Reps 13 -- 25 each leg  -AS        Exercise 14    Exercise Name 14 -- 6\" Lateral Step Overs  -AS     Reps 14 -- 25  -AS        Exercise 15    Exercise Name 15 -- 2\" Lateral Dips  -AS     Reps 15 -- 25  -AS        Exercise 16    Exercise Name 16 -- Heel Raises  -AS     Reps 16 -- 25  -AS        Exercise 17    Exercise Name 17 -- 3-Way Cable Walks  -AS        Exercise 18    Exercise " Name 18 -- Prone Quad Stretch  -AS     Reps 18 -- 10  -AS     Time 18 -- 10 sec hold each  -AS               User Key  (r) = Recorded By, (t) = Taken By, (c) = Cosigned By      Initials Name Provider Type    AS Sheldon Helms, PT Physical Therapist                                            Time Calculation:     Start Time: 0602  Stop Time: 0705  Time Calculation (min): 63 min  Timed Charges  49375 - PT Therapeutic Exercise Minutes: 60  Total Minutes  Timed Charges Total Minutes: 60   Total Minutes: 60     Therapy Charges for Today       Code Description Service Date Service Provider Modifiers Qty    [id number]  PT THER PROC EA 15 MIN 2/20/2025 Sheldon Helms, PT GP 2                      Sheldon Helms, PT  2/20/2025

## 2025-02-24 DIAGNOSIS — Z98.890 STATUS POST ANTERIOR CRUCIATE LIGAMENT SURGERY: ICD-10-CM

## 2025-02-24 RX ORDER — PREGABALIN 50 MG/1
50 CAPSULE ORAL 3 TIMES DAILY
Qty: 90 CAPSULE | Refills: 0 | Status: SHIPPED | OUTPATIENT
Start: 2025-02-24

## 2025-02-24 NOTE — TELEPHONE ENCOUNTER
Rx Refill Note  Requested Prescriptions      No prescriptions requested or ordered in this encounter      Last office visit with prescribing clinician: 2/5/2025      Next office visit with prescribing clinician: 4/7/2025   Last Filled:1/31/25    No diagnosis found.   Date of Surgery:Status post right knee ACL reconstruction with hamstring autograft, medial meniscal repair-12/12/2024       Shelley Esparza MA  02/24/25, 13:42 EST    Previous RX pended for your approval, change or denial.     {TIP  Encounters:    {TIP  Please add Last Relevant Lab Date if appropriate:  {TIP  Is Refill Pharmacy correct?:

## 2025-02-25 ENCOUNTER — TELEPHONE (OUTPATIENT)
Dept: ORTHOPEDIC SURGERY | Facility: CLINIC | Age: 45
End: 2025-02-25

## 2025-02-25 DIAGNOSIS — Z98.890 STATUS POST ANTERIOR CRUCIATE LIGAMENT SURGERY: Primary | ICD-10-CM

## 2025-02-25 DIAGNOSIS — M79.2 NEUROPATHIC PAIN: ICD-10-CM

## 2025-02-25 RX ORDER — PREGABALIN 75 MG/1
75 CAPSULE ORAL 2 TIMES DAILY
Qty: 60 CAPSULE | Refills: 0 | Status: SHIPPED | OUTPATIENT
Start: 2025-02-25

## 2025-02-25 NOTE — TELEPHONE ENCOUNTER
Rx for pregabalin 50mg was sent in yesterday.  Patient calls today and states that she had decided to go back on the 75mg and had recently switched to that dose.  States that she is still experiencing some nerve pain and is asking if the dose should be increased.    Please advise and patient will either need rx for 75mg or other dose Dr. Mortensen thinks is appropriate.    Status post right knee ACL reconstruction with hamstring autograft, medial meniscal repair-12/12/2024

## 2025-02-27 ENCOUNTER — HOSPITAL ENCOUNTER (OUTPATIENT)
Dept: PHYSICAL THERAPY | Facility: HOSPITAL | Age: 45
Setting detail: THERAPIES SERIES
Discharge: HOME | End: 2025-02-27

## 2025-02-27 DIAGNOSIS — S83.231A COMPLEX TEAR OF MEDIAL MENISCUS OF RIGHT KNEE AS CURRENT INJURY, INITIAL ENCOUNTER: ICD-10-CM

## 2025-02-27 DIAGNOSIS — S83.511A RUPTURE OF ANTERIOR CRUCIATE LIGAMENT OF RIGHT KNEE, INITIAL ENCOUNTER: Primary | ICD-10-CM

## 2025-02-27 PROCEDURE — 97110 THERAPEUTIC EXERCISES: CPT

## 2025-02-27 NOTE — THERAPY TREATMENT NOTE
"  Outpatient Physical Therapy Ortho Treatment Note  [facility]     Patient Name: Becca Fonseca  : 1980  MRN: [id number]  Today's Date: 2025      Visit Date: 2025    Visit Dx:    ICD-10-CM ICD-9-CM   1. Rupture of anterior cruciate ligament of right knee, initial encounter  S83.511A 844.2   2. Complex tear of medial meniscus of right knee as current injury, initial encounter  S83.231A 836.0       Patient Active Problem List   Diagnosis    Rupture of anterior cruciate ligament of right knee    Complex tear of medial meniscus of right knee as current injury    Status post anterior cruciate ligament surgery    Encounter to establish care        Past Medical History:   Diagnosis Date    PONV (postoperative nausea and vomiting)         Past Surgical History:   Procedure Laterality Date    KNEE ACL RECONSTRUCTION Right 2024    Procedure: Knee arthroscopy, anterior cruciate ligament reconstruction with hamstring autograft, possible allograft supplement, meniscal and cartilage surgery as indicated;  Surgeon: David Mortensen MD;  Location: [facility];  Service: Orthopedics;  Laterality: Right;    TUBAL ABDOMINAL LIGATION                          PT Assessment/Plan       Row Name 25 0600          PT Assessment    Assessment Comments Patient continues to do improve and do well with PT at this time. Plan to continue to progress patient as tolerated.  -AS        PT Plan    PT Plan Comments Continue with current treatment plan.  -AS               User Key  (r) = Recorded By, (t) = Taken By, (c) = Cosigned By      Initials Name Provider Type    AS Sheldon Helms, PT Physical Therapist                       OP Exercises       Row Name 25 0712 25 0600          Subjective    Subjective Comments -- Patient states her knee is doing better and states \"it is solid.\"  -AS        Total Minutes    08368 - PT Therapeutic Exercise Minutes 60  -AS --        Exercise 1    Exercise Name " "1 -- Heel Slides  -AS     Time 1 -- --  -AS        Exercise 2    Exercise Name 2 -- Wall Slides  -AS        Exercise 3    Exercise Name 3 -- Bike (seat 0)  -AS     Time 3 -- 5 min  -AS        Exercise 4    Exercise Name 4 -- HS Stretch  -AS     Reps 4 -- 10  -AS     Time 4 -- 10 sec hold each  -AS        Exercise 5    Exercise Name 5 -- PLH  -AS     Time 5 -- 5 min - 3#  -AS        Exercise 6    Exercise Name 6 -- QS  -AS     Reps 6 -- 25  -AS     Time 6 -- 5 sec hold each  -AS        Exercise 7    Exercise Name 7 -- 4-Way Hip  -AS     Reps 7 -- 25 each  -AS     Time 7 -- 3#  -AS        Exercise 8    Exercise Name 8 -- Single Leg HS vs Stool  -AS     Reps 8 -- 25  -AS        Exercise 9    Exercise Name 9 -- LAQ  -AS     Reps 9 -- 25  -AS        Exercise 10    Exercise Name 10 -- TKE  -AS     Reps 10 -- 25  -AS     Time 10 -- Gold  -AS        Exercise 11    Exercise Name 11 -- Standing HS Curls  -AS     Reps 11 -- 25  -AS        Exercise 12    Exercise Name 12 -- Mini Squats  -AS     Reps 12 -- 25  -AS        Exercise 13    Exercise Name 13 -- 6\" FWD Step Ups  -AS     Reps 13 -- 25 each leg  -AS        Exercise 14    Exercise Name 14 -- 6\" Lateral Step Overs  -AS     Reps 14 -- 25  -AS        Exercise 15    Exercise Name 15 -- 4\" Lateral Dips  -AS     Reps 15 -- 25  -AS        Exercise 16    Exercise Name 16 -- Heel Raises  -AS     Reps 16 -- 25  -AS        Exercise 17    Exercise Name 17 -- 3-Way Cable Walks  -AS     Reps 17 -- Start on 3-6  -AS     Time 17 -- --  -AS        Exercise 18    Exercise Name 18 -- --  -AS     Reps 18 -- --  -AS     Time 18 -- --  -AS               User Key  (r) = Recorded By, (t) = Taken By, (c) = Cosigned By      Initials Name Provider Type    AS Sheldon Helms, PT Physical Therapist                                                    Time Calculation:   Start Time: 0603  Stop Time: 0709  Time Calculation (min): 66 min  Timed Charges  75537 - PT Therapeutic Exercise Minutes: " 60  Total Minutes  Timed Charges Total Minutes: 60   Total Minutes: 60  Therapy Charges for Today       Code Description Service Date Service Provider Modifiers Qty    96995068277 HC PT THER PROC EA 15 MIN 2/27/2025 Sheldon Helms, PT GP 2                      Sheldon Helms, PT  2/27/2025

## 2025-03-06 ENCOUNTER — HOSPITAL ENCOUNTER (OUTPATIENT)
Dept: PHYSICAL THERAPY | Facility: HOSPITAL | Age: 45
Setting detail: THERAPIES SERIES
Discharge: HOME | End: 2025-03-06

## 2025-03-06 DIAGNOSIS — S83.511A RUPTURE OF ANTERIOR CRUCIATE LIGAMENT OF RIGHT KNEE, INITIAL ENCOUNTER: Primary | ICD-10-CM

## 2025-03-06 DIAGNOSIS — S83.231A COMPLEX TEAR OF MEDIAL MENISCUS OF RIGHT KNEE AS CURRENT INJURY, INITIAL ENCOUNTER: ICD-10-CM

## 2025-03-06 PROCEDURE — 97110 THERAPEUTIC EXERCISES: CPT

## 2025-03-06 NOTE — THERAPY TREATMENT NOTE
Outpatient Physical Therapy Ortho Treatment Note  [facility]     Patient Name: Becca Fonseca  : 1980  MRN: [id number]  Today's Date: 3/6/2025      Visit Date: 2025    Visit Dx:    ICD-10-CM ICD-9-CM   1. Rupture of anterior cruciate ligament of right knee, initial encounter  S83.511A 844.2   2. Complex tear of medial meniscus of right knee as current injury, initial encounter  S83.231A 836.0       Patient Active Problem List   Diagnosis    Rupture of anterior cruciate ligament of right knee    Complex tear of medial meniscus of right knee as current injury    Status post anterior cruciate ligament surgery    Encounter to establish care        Past Medical History:   Diagnosis Date    PONV (postoperative nausea and vomiting)         Past Surgical History:   Procedure Laterality Date    KNEE ACL RECONSTRUCTION Right 2024    Procedure: Knee arthroscopy, anterior cruciate ligament reconstruction with hamstring autograft, possible allograft supplement, meniscal and cartilage surgery as indicated;  Surgeon: David Mortensen MD;  Location: [facility];  Service: Orthopedics;  Laterality: Right;    TUBAL ABDOMINAL LIGATION                          PT Assessment/Plan       Row Name 25 0600          PT Assessment    Assessment Comments Patient continues to do improve and do well with PT at this time. Plan to continue to progress patient as tolerated. Discussed with patient about bringing tennis shoes to her next PT session in order to safely perform cable walk exercises.  -AS        PT Plan    PT Plan Comments Continue with current treatment plan.  -AS               User Key  (r) = Recorded By, (t) = Taken By, (c) = Cosigned By      Initials Name Provider Type    AS Sheldon Helms, PT Physical Therapist                       OP Exercises       Row Name 25 0713 25 0600          Subjective    Subjective Comments -- Patient states she is doing better. She states she has  "been more confident in her knee and is able to walk around without her brace. She states she was able to tolerate a lot of walking at the [employer] this past weekend.  -AS        Total Minutes    90998 - PT Therapeutic Exercise Minutes 60  -AS --        Exercise 1    Exercise Name 1 -- Heel Slides  -AS        Exercise 2    Exercise Name 2 -- Wall Slides  -AS        Exercise 3    Exercise Name 3 -- Bike (seat 0)  -AS     Time 3 -- 5 min  -AS        Exercise 4    Exercise Name 4 -- HS Stretch  -AS     Reps 4 -- 10  -AS     Time 4 -- 10 sec hold each  -AS        Exercise 5    Exercise Name 5 -- PLH  -AS     Time 5 -- 5 min - 3#  -AS        Exercise 6    Exercise Name 6 -- QS  -AS     Reps 6 -- 25  -AS     Time 6 -- 5 sec hold each  -AS        Exercise 7    Exercise Name 7 -- 4-Way Hip  -AS     Reps 7 -- 25 each  -AS     Time 7 -- 3#  -AS        Exercise 8    Exercise Name 8 -- Single Leg HS vs Stool  -AS     Reps 8 -- 25  -AS        Exercise 9    Exercise Name 9 -- LAQ  -AS     Reps 9 -- 25  -AS        Exercise 10    Exercise Name 10 -- TKE  -AS     Reps 10 -- 25  -AS     Time 10 -- Gold  -AS        Exercise 11    Exercise Name 11 -- Standing HS Curls  -AS     Reps 11 -- 25  -AS     Time 11 -- 3#  -AS        Exercise 12    Exercise Name 12 -- Mini Squats  -AS     Reps 12 -- 25  -AS        Exercise 13    Exercise Name 13 -- 6\" FWD Step Ups  -AS     Reps 13 -- 25 each leg  -AS        Exercise 14    Exercise Name 14 -- 6\" Lateral Step Overs  -AS     Reps 14 -- 25  -AS        Exercise 15    Exercise Name 15 -- 4\" Lateral Dips  -AS     Reps 15 -- 25  -AS        Exercise 16    Exercise Name 16 -- Heel Raises  -AS     Reps 16 -- 25  -AS        Exercise 17    Exercise Name 17 -- 3-Way Cable Walks  -AS     Reps 17 -- Start on 3-13  -AS               User Key  (r) = Recorded By, (t) = Taken By, (c) = Cosigned By      Initials Name Provider Type    AS Sheldon Helms, PT Physical Therapist              "                                       Time Calculation:   Start Time: 0604  Stop Time: 0710  Time Calculation (min): 66 min  Timed Charges  26740 - PT Therapeutic Exercise Minutes: 60  Total Minutes  Timed Charges Total Minutes: 60   Total Minutes: 60  Therapy Charges for Today       Code Description Service Date Service Provider Modifiers Qty    50846826706 HC PT THER PROC EA 15 MIN 3/6/2025 Sheldon Helms, PT GP 2                      Sheldon Helms, PT  3/6/2025

## 2025-03-13 ENCOUNTER — HOSPITAL ENCOUNTER (OUTPATIENT)
Dept: PHYSICAL THERAPY | Facility: HOSPITAL | Age: 45
Setting detail: THERAPIES SERIES
Discharge: HOME | End: 2025-03-13

## 2025-03-13 DIAGNOSIS — S83.231A COMPLEX TEAR OF MEDIAL MENISCUS OF RIGHT KNEE AS CURRENT INJURY, INITIAL ENCOUNTER: ICD-10-CM

## 2025-03-13 DIAGNOSIS — S83.511A RUPTURE OF ANTERIOR CRUCIATE LIGAMENT OF RIGHT KNEE, INITIAL ENCOUNTER: Primary | ICD-10-CM

## 2025-03-13 PROCEDURE — 97110 THERAPEUTIC EXERCISES: CPT

## 2025-03-13 NOTE — THERAPY TREATMENT NOTE
Outpatient Physical Therapy Ortho Treatment Note  [facility]     Patient Name: Becca Fonseca  : 1980  MRN: [id number]  Today's Date: 3/13/2025      Visit Date: 2025    Visit Dx:    ICD-10-CM ICD-9-CM   1. Rupture of anterior cruciate ligament of right knee, initial encounter  S83.511A 844.2   2. Complex tear of medial meniscus of right knee as current injury, initial encounter  S83.231A 836.0       Patient Active Problem List   Diagnosis    Rupture of anterior cruciate ligament of right knee    Complex tear of medial meniscus of right knee as current injury    Status post anterior cruciate ligament surgery    Encounter to establish care        Past Medical History:   Diagnosis Date    PONV (postoperative nausea and vomiting)         Past Surgical History:   Procedure Laterality Date    KNEE ACL RECONSTRUCTION Right 2024    Procedure: Knee arthroscopy, anterior cruciate ligament reconstruction with hamstring autograft, possible allograft supplement, meniscal and cartilage surgery as indicated;  Surgeon: David oMrtensen MD;  Location: [facility];  Service: Orthopedics;  Laterality: Right;    TUBAL ABDOMINAL LIGATION                          PT Assessment/Plan       Row Name 25 0600          PT Assessment    Assessment Comments Patient continues to do improve and do well with PT at this time. Plan to continue to progress patient as tolerated.  -AS        PT Plan    PT Plan Comments Continue with current treatment plan.  -AS               User Key  (r) = Recorded By, (t) = Taken By, (c) = Cosigned By      Initials Name Provider Type    AS Sheldon Helms, PT Physical Therapist                       OP Exercises       Row Name 25 0709 25 0600          Subjective    Subjective Comments -- Patient states she is doing well. She states she has been walking and standing a lot this week which has made her sore. She states she still has issues with the feeling in her leg  "but feels it is improving.  -AS        Total Minutes    48880 - PT Therapeutic Exercise Minutes 65  -AS --        Exercise 1    Exercise Name 1 -- Heel Slides  -AS        Exercise 2    Exercise Name 2 -- Wall Slides  -AS        Exercise 3    Exercise Name 3 -- Bike (seat 0)  -AS     Time 3 -- 5 min  -AS        Exercise 4    Exercise Name 4 -- HS Stretch  -AS     Reps 4 -- 10  -AS     Time 4 -- 10 sec hold each  -AS        Exercise 5    Exercise Name 5 -- PLH  -AS     Time 5 -- 5 min - 3#  -AS        Exercise 6    Exercise Name 6 -- QS  -AS     Reps 6 -- 25  -AS     Time 6 -- 5 sec hold each  -AS        Exercise 7    Exercise Name 7 -- 4-Way Hip  -AS     Reps 7 -- 25 each  -AS     Time 7 -- 3#  -AS        Exercise 8    Exercise Name 8 -- Single Leg HS vs Stool  -AS     Reps 8 -- 25  -AS        Exercise 9    Exercise Name 9 -- LAQ  -AS     Reps 9 -- 25  -AS        Exercise 10    Exercise Name 10 -- TKE  -AS     Reps 10 -- 25  -AS     Time 10 -- Gold  -AS        Exercise 11    Exercise Name 11 -- Standing HS Curls  -AS     Reps 11 -- 25  -AS     Time 11 -- 3#  -AS        Exercise 12    Exercise Name 12 -- Mini Squats  -AS     Reps 12 -- 25  -AS        Exercise 13    Exercise Name 13 -- 6\" FWD Step Ups  -AS     Reps 13 -- 25 each leg  -AS        Exercise 14    Exercise Name 14 -- 6\" Lateral Step Overs  -AS     Reps 14 -- 25  -AS        Exercise 15    Exercise Name 15 -- 4\" Lateral Dips  -AS     Reps 15 -- 25  -AS        Exercise 16    Exercise Name 16 -- Heel Raises  -AS     Reps 16 -- 25  -AS        Exercise 17    Exercise Name 17 -- 3-Way Cable Walks  -AS     Reps 17 -- 5x each  -AS     Time 17 -- 25#  -AS               User Key  (r) = Recorded By, (t) = Taken By, (c) = Cosigned By      Initials Name Provider Type    AS Sheldon Helms, PT Physical Therapist                                                    Time Calculation:   Start Time: 0600  Stop Time: 0709  Time Calculation (min): 69 min  Timed " Charges  64988 - PT Therapeutic Exercise Minutes: 65  Total Minutes  Timed Charges Total Minutes: 65   Total Minutes: 65  Therapy Charges for Today       Code Description Service Date Service Provider Modifiers Qty    [id number]  PT THER PROC EA 15 MIN 3/13/2025 Sheldon Helms, PT GP 2                      Sheldon Helms, PT  3/13/2025

## 2025-03-20 ENCOUNTER — HOSPITAL ENCOUNTER (OUTPATIENT)
Dept: PHYSICAL THERAPY | Facility: HOSPITAL | Age: 45
Setting detail: THERAPIES SERIES
Discharge: HOME | End: 2025-03-20

## 2025-03-20 DIAGNOSIS — S83.231A COMPLEX TEAR OF MEDIAL MENISCUS OF RIGHT KNEE AS CURRENT INJURY, INITIAL ENCOUNTER: ICD-10-CM

## 2025-03-20 DIAGNOSIS — S83.511A RUPTURE OF ANTERIOR CRUCIATE LIGAMENT OF RIGHT KNEE, INITIAL ENCOUNTER: Primary | ICD-10-CM

## 2025-03-20 PROCEDURE — 97110 THERAPEUTIC EXERCISES: CPT

## 2025-03-20 NOTE — THERAPY TREATMENT NOTE
Outpatient Physical Therapy Ortho Treatment Note  [facility]     Patient Name: Becca Fonseca  : 1980  MRN: [id number]  Today's Date: 3/20/2025      Visit Date: 2025    Visit Dx:    ICD-10-CM ICD-9-CM   1. Rupture of anterior cruciate ligament of right knee, initial encounter  S83.511A 844.2   2. Complex tear of medial meniscus of right knee as current injury, initial encounter  S83.231A 836.0       Patient Active Problem List   Diagnosis    Rupture of anterior cruciate ligament of right knee    Complex tear of medial meniscus of right knee as current injury    Status post anterior cruciate ligament surgery    Encounter to establish care        Past Medical History:   Diagnosis Date    PONV (postoperative nausea and vomiting)         Past Surgical History:   Procedure Laterality Date    KNEE ACL RECONSTRUCTION Right 2024    Procedure: Knee arthroscopy, anterior cruciate ligament reconstruction with hamstring autograft, possible allograft supplement, meniscal and cartilage surgery as indicated;  Surgeon: David Mortensen MD;  Location: [facility];  Service: Orthopedics;  Laterality: Right;    TUBAL ABDOMINAL LIGATION                          PT Assessment/Plan       Row Name 25 0600          PT Assessment    Assessment Comments Patient continues to do improve and do well with PT at this time. Plan to continue to progress patient as tolerated.  -AS        PT Plan    PT Plan Comments Continue with current treatment plan.  -AS               User Key  (r) = Recorded By, (t) = Taken By, (c) = Cosigned By      Initials Name Provider Type    AS Sheldon Helms, PT Physical Therapist                       OP Exercises       Row Name 25 0657 25 0600          Subjective    Subjective Comments -- Patient states her knee continues to improve. She states she is feeling more confident in her knee with ADL's.  -AS        Total Minutes    12063 - PT Therapeutic Exercise Minutes  "50  -AS --        Exercise 1    Exercise Name 1 -- Heel Slides  -AS        Exercise 2    Exercise Name 2 -- Wall Slides  -AS        Exercise 3    Exercise Name 3 -- Bike (seat 0)  -AS     Time 3 -- 5 min  -AS        Exercise 4    Exercise Name 4 -- HS Stretch  -AS     Reps 4 -- 10  -AS     Time 4 -- 10 sec hold each  -AS        Exercise 5    Exercise Name 5 -- PLH  -AS     Time 5 -- 5 min - 3#  -AS        Exercise 6    Exercise Name 6 -- QS  -AS     Reps 6 -- 25  -AS     Time 6 -- 5 sec hold each  -AS        Exercise 7    Exercise Name 7 -- 4-Way Hip  -AS     Reps 7 -- 25 each  -AS     Time 7 -- 3#  -AS        Exercise 8    Exercise Name 8 -- HS vs Ball  -AS     Reps 8 -- 25  -AS        Exercise 9    Exercise Name 9 -- LAQ  -AS     Reps 9 -- 25  -AS        Exercise 10    Exercise Name 10 -- TKE  -AS     Reps 10 -- 25  -AS     Time 10 -- Gold  -AS        Exercise 11    Exercise Name 11 -- Standing HS Curls  -AS     Reps 11 -- 25  -AS     Time 11 -- 3#  -AS        Exercise 12    Exercise Name 12 -- Mini Squats  -AS     Reps 12 -- 25  -AS        Exercise 13    Exercise Name 13 -- 6\" FWD Step Ups  -AS     Reps 13 -- 25 each leg  -AS        Exercise 14    Exercise Name 14 -- 6\" Lateral Step Overs  -AS     Reps 14 -- 25  -AS        Exercise 15    Exercise Name 15 -- 4\" Lateral Dips  -AS     Reps 15 -- 25  -AS        Exercise 16    Exercise Name 16 -- Heel Raises  -AS     Reps 16 -- 25  -AS        Exercise 17    Exercise Name 17 -- 3-Way Cable Walks  -AS     Reps 17 -- 5x each  -AS     Time 17 -- 30#  -AS               User Key  (r) = Recorded By, (t) = Taken By, (c) = Cosigned By      Initials Name Provider Type    AS Sheldon Helms, PT Physical Therapist                                                    Time Calculation:   Start Time: 0558  Stop Time: 0650  Time Calculation (min): 52 min  Timed Charges  93503 - PT Therapeutic Exercise Minutes: 50  Total Minutes  Timed Charges Total Minutes: 50   Total Minutes: " 50  Therapy Charges for Today       Code Description Service Date Service Provider Modifiers Qty    57572031041 HC PT THER PROC EA 15 MIN 3/20/2025 Sheldon Helms, PT GP 2                      Sheldon Helms, PT  3/20/2025

## 2025-03-25 DIAGNOSIS — M79.2 NEUROPATHIC PAIN: ICD-10-CM

## 2025-03-25 DIAGNOSIS — Z98.890 STATUS POST ANTERIOR CRUCIATE LIGAMENT SURGERY: ICD-10-CM

## 2025-03-25 RX ORDER — PREGABALIN 75 MG/1
75 CAPSULE ORAL 2 TIMES DAILY
Qty: 60 CAPSULE | Refills: 0 | Status: SHIPPED | OUTPATIENT
Start: 2025-03-25

## 2025-03-25 NOTE — TELEPHONE ENCOUNTER
Rx Refill Note  Requested Prescriptions      No prescriptions requested or ordered in this encounter      Last office visit with prescribing clinician: 2/5/2025      Next office visit with prescribing clinician: 4/7/2025   Last Filled:2/25/2025    No diagnosis found.   Date of Surgery:Status post right knee ACL reconstruction with hamstring autograft, medial meniscal repair-12/12/2024         Shelley Esparza MA  03/25/25, 13:51 EDT    Previous RX pended for your approval, change or denial.     {TIP  Encounters:    {TIP  Please add Last Relevant Lab Date if appropriate:  {TIP  Is Refill Pharmacy correct?:

## 2025-03-27 ENCOUNTER — HOSPITAL ENCOUNTER (OUTPATIENT)
Dept: PHYSICAL THERAPY | Facility: HOSPITAL | Age: 45
Setting detail: THERAPIES SERIES
Discharge: HOME | End: 2025-03-27

## 2025-03-27 DIAGNOSIS — S83.511A RUPTURE OF ANTERIOR CRUCIATE LIGAMENT OF RIGHT KNEE, INITIAL ENCOUNTER: Primary | ICD-10-CM

## 2025-03-27 DIAGNOSIS — S83.231A COMPLEX TEAR OF MEDIAL MENISCUS OF RIGHT KNEE AS CURRENT INJURY, INITIAL ENCOUNTER: ICD-10-CM

## 2025-03-27 PROCEDURE — 97110 THERAPEUTIC EXERCISES: CPT

## 2025-03-27 NOTE — THERAPY TREATMENT NOTE
Outpatient Physical Therapy Ortho Treatment Note  [facility]     Patient Name: Becca Fonseca  : 1980  MRN: [id number]  Today's Date: 3/27/2025      Visit Date: 2025    Visit Dx:    ICD-10-CM ICD-9-CM   1. Rupture of anterior cruciate ligament of right knee, initial encounter  S83.511A 844.2   2. Complex tear of medial meniscus of right knee as current injury, initial encounter  S83.231A 836.0       Patient Active Problem List   Diagnosis    Rupture of anterior cruciate ligament of right knee    Complex tear of medial meniscus of right knee as current injury    Status post anterior cruciate ligament surgery    Encounter to establish care        Past Medical History:   Diagnosis Date    PONV (postoperative nausea and vomiting)         Past Surgical History:   Procedure Laterality Date    KNEE ACL RECONSTRUCTION Right 2024    Procedure: Knee arthroscopy, anterior cruciate ligament reconstruction with hamstring autograft, possible allograft supplement, meniscal and cartilage surgery as indicated;  Surgeon: David Mortensen MD;  Location: [facility];  Service: Orthopedics;  Laterality: Right;    TUBAL ABDOMINAL LIGATION                          PT Assessment/Plan       Row Name 25 0600          PT Assessment    Assessment Comments Patient continues to do improve and do well with PT at this time. Plan to continue to progress patient as tolerated.  -AS        PT Plan    PT Plan Comments Continue with current treatment plan.  -AS               User Key  (r) = Recorded By, (t) = Taken By, (c) = Cosigned By      Initials Name Provider Type    AS Sheldon Helms, PT Physical Therapist                       OP Exercises       Row Name 25 0659 25 0600          Subjective    Subjective Comments -- Patient states her knee is doing well overall. She states she has returned to the gym and has been working out again. She states this has made her knee more sore. She states she  "has also been getting more \"nerve pain\" this past week.  -AS        Total Minutes    45089 - PT Therapeutic Exercise Minutes 45  -AS --        Exercise 1    Exercise Name 1 -- Heel Slides  -AS        Exercise 2    Exercise Name 2 -- Wall Slides  -AS        Exercise 3    Exercise Name 3 -- Bike (seat 0)  -AS     Time 3 -- 5 min  -AS        Exercise 4    Exercise Name 4 -- HS Stretch  -AS     Reps 4 -- 10  -AS     Time 4 -- 10 sec hold each  -AS        Exercise 5    Exercise Name 5 -- PLH  -AS     Time 5 -- 5 min - 3#  -AS        Exercise 6    Exercise Name 6 -- QS  -AS     Reps 6 -- 25  -AS     Time 6 -- 5 sec hold each  -AS        Exercise 7    Exercise Name 7 -- 4-Way Hip  -AS     Reps 7 -- 25 each  -AS     Time 7 -- 4#  -AS        Exercise 8    Exercise Name 8 -- HS vs Ball  -AS     Reps 8 -- 25  -AS        Exercise 9    Exercise Name 9 -- LAQ  -AS     Reps 9 -- 25  -AS        Exercise 10    Exercise Name 10 -- TKE  -AS     Reps 10 -- 25  -AS     Time 10 -- Gold  -AS        Exercise 11    Exercise Name 11 -- Standing HS Curls  -AS     Reps 11 -- 25  -AS     Time 11 -- 4#  -AS        Exercise 12    Exercise Name 12 -- Mini Squats  -AS     Reps 12 -- 25  -AS        Exercise 13    Exercise Name 13 -- 6\" FWD Step Ups  -AS     Reps 13 -- 25 each leg  -AS        Exercise 14    Exercise Name 14 -- 6\" Lateral Step Overs  -AS     Reps 14 -- 25  -AS        Exercise 15    Exercise Name 15 -- 4\" Lateral Dips  -AS     Reps 15 -- 25  -AS        Exercise 16    Exercise Name 16 -- Heel Raises  -AS     Reps 16 -- 25  -AS        Exercise 17    Exercise Name 17 -- 3-Way Cable Walks  -AS     Reps 17 -- 5x each  -AS     Time 17 -- 30#  -AS               User Key  (r) = Recorded By, (t) = Taken By, (c) = Cosigned By      Initials Name Provider Type    AS Sheldon Helms, PT Physical Therapist                                                    Time Calculation:   Start Time: 0610  Stop Time: 0659  Time Calculation (min): 49 " min  Timed Charges  20765 - PT Therapeutic Exercise Minutes: 45  Total Minutes  Timed Charges Total Minutes: 45   Total Minutes: 45  Therapy Charges for Today       Code Description Service Date Service Provider Modifiers Qty    20210891438  PT THER PROC EA 15 MIN 3/27/2025 Sheldon Helms, PT GP 2                      Sheldon Helms, PT  3/27/2025

## 2025-04-07 ENCOUNTER — OFFICE VISIT (OUTPATIENT)
Dept: ORTHOPEDIC SURGERY | Facility: CLINIC | Age: 45
End: 2025-04-07
Payer: MEDICAID

## 2025-04-07 VITALS — HEIGHT: 67 IN | BODY MASS INDEX: 23.7 KG/M2 | WEIGHT: 151 LBS

## 2025-04-07 DIAGNOSIS — M79.2 NEUROPATHIC PAIN: ICD-10-CM

## 2025-04-07 DIAGNOSIS — Z98.890 STATUS POST ANTERIOR CRUCIATE LIGAMENT SURGERY: Primary | ICD-10-CM

## 2025-04-07 RX ORDER — ASPIRIN 81 MG/1
81 TABLET ORAL DAILY
COMMUNITY

## 2025-04-07 RX ORDER — PREDNISONE 10 MG/1
TABLET ORAL
Qty: 39 TABLET | Refills: 0 | Status: SHIPPED | OUTPATIENT
Start: 2025-04-07

## 2025-04-07 NOTE — PROGRESS NOTES
"Subjective:     Patient ID: Becca Fonseca is a 44 y.o. female.    Chief Complaint:  Status post right knee ACL reconstruction with hamstring autograft, medial meniscal repair-12/12/2024   History of Present Illness  The patient returns to the clinic today for a follow-up evaluation regarding her right knee. She is doing fairly well at this point in time. She still is having some intermittent swelling episodes with limited irritation and mobility from these. The main issue still is that she is having some hypersensitivity to light touch though it is improved with Lyrica as well as soft tissue massage and it has been getting better over the last several weeks. She reports no episodes of buckling, catching, locking, or giving way in the right knee. No fevers, chills, or sweats.    MEDICATIONS  Lyrica    Social History     Occupational History    Not on file   Tobacco Use    Smoking status: Every Day     Current packs/day: 0.75     Average packs/day: 0.8 packs/day for 4.3 years (3.2 ttl pk-yrs)     Types: Cigarettes     Start date: 2021     Passive exposure: Current    Smokeless tobacco: Never   Vaping Use    Vaping status: Never Used   Substance and Sexual Activity    Alcohol use: Yes     Comment: 1    Drug use: Never    Sexual activity: Defer      Past Medical History:   Diagnosis Date    PONV (postoperative nausea and vomiting)      Past Surgical History:   Procedure Laterality Date    KNEE ACL RECONSTRUCTION Right 12/12/2024    Procedure: Knee arthroscopy, anterior cruciate ligament reconstruction with hamstring autograft, possible allograft supplement, meniscal and cartilage surgery as indicated;  Surgeon: David Mortensen MD;  Location: [facility];  Service: Orthopedics;  Laterality: Right;    TUBAL ABDOMINAL LIGATION  2007       History reviewed. No pertinent family history.      Review of Systems        Objective:  Vitals:    04/07/25 0944   Weight: 68.5 kg (151 lb)   Height: 170.2 cm (67\")         " 04/07/25  0944   Weight: 68.5 kg (151 lb)     Body mass index is 23.65 kg/m².    Physical Exam    Vital signs reviewed.   General: No acute distress, alert and oriented  Eyes: conjunctiva clear; pupils equally round and reactive  ENT: external ears and nose atraumatic; oropharynx clear  CV: no peripheral edema  Resp: normal respiratory effort  Skin: no rashes or wounds; normal turgor  Psych: mood and affect appropriate; recent and remote memory intact       Physical Exam  The incision on the right knee is well healed. Active motion in the right knee is 0 to 140 degrees, with 5 out of 5 strength on flexion and extension. No extensor lag, grade 1A Lachman. Negative anterior posterior drawer. No joint line pain. Mild effusion. The knee is stable to varus and valgus stress at zero and 30 degrees. Positive sensation to light touch though. Diffuse hypersensitivity noted throughout the right lower extremity mostly in the lateral thigh extending down into the anterolateral leg. Brisk capillary refill to all digits, 2+ dorsalis pedis pulse.        Imaging:  Right Knee X-Ray  Indication: Status post anterior cruciate ligament reconstruction    AP, Lateral, and notch views    Findings:  Tibial and femoral tunnels in stable position and with no significant tunnel lysis  Implants evident with no evidence of loosening or disruption  Acceptable overall alignment  No evidence of intra-articular loose body    Compared to prior office x-rays    Assessment:        1. Status post anterior cruciate ligament surgery    2. Neuropathic pain             Assessment & Plan  1. Right knee neuropathic pain and hypersensitivity.  This is likely related to underlying complex regional pain syndrome. Discussed options at length with patient. A prednisone taper will be added to help resolve some of the issues related to her neuropathic pain and hypersensitivity. She will continue with Lyrica as tolerated and her home exercise program. She is  transitioning out of physical therapy given her good improvement in strength and function. A referral to pain management for consideration of a sympathetic nerve block was offered, but she prefers to hold off on that at this time. No x-rays needed.    Follow-up  The patient will follow up in 3 months.    Becca Fonseca was in agreement with plan and had all questions answered.     Orders:  Orders Placed This Encounter   Procedures    XR Knee 3 View Right       Medications:  New Medications Ordered This Visit   Medications    predniSONE (DELTASONE) 10 MG tablet     Si mg po daily x 3 days, then 40 mg po daily x 3 days, then 20 mg po daily x 3 days, then 10 mg po daily x 3 days     Dispense:  39 tablet     Refill:  0       Followup:  Return in about 3 months (around 2025).    Diagnoses and all orders for this visit:    1. Status post anterior cruciate ligament surgery (Primary)  -     XR Knee 3 View Right    2. Neuropathic pain    Other orders  -     predniSONE (DELTASONE) 10 MG tablet; 60 mg po daily x 3 days, then 40 mg po daily x 3 days, then 20 mg po daily x 3 days, then 10 mg po daily x 3 days  Dispense: 39 tablet; Refill: 0        BMI is within normal parameters. No other follow-up for BMI required.          Dictated utilizing Dragon dictation     Patient or patient representative verbalized consent for the use of Ambient Listening during the visit with  David Mortensen MD for chart documentation. 2025  09:57 EDT

## 2025-04-10 ENCOUNTER — HOSPITAL ENCOUNTER (OUTPATIENT)
Dept: PHYSICAL THERAPY | Facility: HOSPITAL | Age: 45
Setting detail: THERAPIES SERIES
Discharge: HOME | End: 2025-04-10

## 2025-04-10 DIAGNOSIS — S83.231A COMPLEX TEAR OF MEDIAL MENISCUS OF RIGHT KNEE AS CURRENT INJURY, INITIAL ENCOUNTER: ICD-10-CM

## 2025-04-10 DIAGNOSIS — S83.511A RUPTURE OF ANTERIOR CRUCIATE LIGAMENT OF RIGHT KNEE, INITIAL ENCOUNTER: Primary | ICD-10-CM

## 2025-04-10 PROCEDURE — 97110 THERAPEUTIC EXERCISES: CPT

## 2025-04-10 NOTE — THERAPY TREATMENT NOTE
Outpatient Physical Therapy Ortho Treatment Note  [facility]     Patient Name: Becca Fonseca  : 1980  MRN: [id number]  Today's Date: 4/10/2025      Visit Date: 04/10/2025    Visit Dx:    ICD-10-CM ICD-9-CM   1. Rupture of anterior cruciate ligament of right knee, initial encounter  S83.511A 844.2   2. Complex tear of medial meniscus of right knee as current injury, initial encounter  S83.231A 836.0       Patient Active Problem List   Diagnosis    Rupture of anterior cruciate ligament of right knee    Complex tear of medial meniscus of right knee as current injury    Status post anterior cruciate ligament surgery    Encounter to establish care        Past Medical History:   Diagnosis Date    PONV (postoperative nausea and vomiting)         Past Surgical History:   Procedure Laterality Date    KNEE ACL RECONSTRUCTION Right 2024    Procedure: Knee arthroscopy, anterior cruciate ligament reconstruction with hamstring autograft, possible allograft supplement, meniscal and cartilage surgery as indicated;  Surgeon: David Mortensen MD;  Location: [facility];  Service: Orthopedics;  Laterality: Right;    TUBAL ABDOMINAL LIGATION                          PT Assessment/Plan       Row Name 04/10/25 0500          PT Assessment    Assessment Comments Patient continues to do improve and do well with PT at this time. Plan to continue to progress patient as tolerated.  -AS        PT Plan    PT Plan Comments Continue with current treatment plan.  -AS               User Key  (r) = Recorded By, (t) = Taken By, (c) = Cosigned By      Initials Name Provider Type    AS Sheldon Helms, PT Physical Therapist                       OP Exercises       Row Name 04/10/25 0704 04/10/25 0500          Subjective    Subjective Comments -- Patient states she is doing well and is returning to working out in the gym.  -AS        Total Minutes    02090 - PT Therapeutic Exercise Minutes 60  -AS --        Exercise 1     "Exercise Name 1 -- Heel Slides  -AS        Exercise 2    Exercise Name 2 -- Wall Slides  -AS        Exercise 3    Exercise Name 3 -- Bike (seat 0)  -AS     Time 3 -- 5 min  -AS        Exercise 4    Exercise Name 4 -- HS Stretch  -AS     Reps 4 -- 10  -AS     Time 4 -- 10 sec hold each  -AS        Exercise 5    Exercise Name 5 -- PLH  -AS     Time 5 -- 5 min - 3#  -AS        Exercise 6    Exercise Name 6 -- QS  -AS     Reps 6 -- 25  -AS     Time 6 -- 5 sec hold each  -AS        Exercise 7    Exercise Name 7 -- 4-Way Hip  -AS     Reps 7 -- 25 each  -AS     Time 7 -- 4#  -AS        Exercise 8    Exercise Name 8 -- HS vs Ball  -AS     Reps 8 -- 25  -AS        Exercise 9    Exercise Name 9 -- LAQ  -AS     Reps 9 -- 25  -AS        Exercise 10    Exercise Name 10 -- TKE  -AS     Reps 10 -- 25  -AS     Time 10 -- Gold  -AS        Exercise 11    Exercise Name 11 -- Standing HS Curls  -AS     Reps 11 -- 25  -AS     Time 11 -- 4#  -AS        Exercise 12    Exercise Name 12 -- Mini Squats  -AS     Reps 12 -- 25  -AS        Exercise 13    Exercise Name 13 -- 6\" FWD Step Ups  -AS     Reps 13 -- 25 each leg  -AS        Exercise 14    Exercise Name 14 -- 6\" Lateral Step Overs  -AS     Reps 14 -- 25  -AS        Exercise 15    Exercise Name 15 -- 4\" Lateral Dips  -AS     Reps 15 -- 25  -AS        Exercise 16    Exercise Name 16 -- Heel Raises  -AS     Reps 16 -- 25  -AS        Exercise 17    Exercise Name 17 -- 3-Way Cable Walks  -AS     Reps 17 -- 5x each  -AS     Time 17 -- 35#  -AS        Exercise 18    Exercise Name 18 -- Lunges  -AS     Reps 18 -- 20 each way  -AS               User Key  (r) = Recorded By, (t) = Taken By, (c) = Cosigned By      Initials Name Provider Type    AS Sheldon Helms, PT Physical Therapist                                                    Time Calculation:   Start Time: 0557  Stop Time: 0702  Time Calculation (min): 65 min  Timed Charges  79241 - PT Therapeutic Exercise Minutes: 60  Total " Minutes  Timed Charges Total Minutes: 60   Total Minutes: 60  Therapy Charges for Today       Code Description Service Date Service Provider Modifiers Qty    44760381381 HC PT THER PROC EA 15 MIN 4/10/2025 Sheldon Helms, PT GP 2                      Sheldon Helms, PT  4/10/2025

## 2025-04-24 ENCOUNTER — HOSPITAL ENCOUNTER (OUTPATIENT)
Dept: PHYSICAL THERAPY | Facility: HOSPITAL | Age: 45
Setting detail: THERAPIES SERIES
Discharge: HOME | End: 2025-04-24

## 2025-04-24 DIAGNOSIS — S83.511A RUPTURE OF ANTERIOR CRUCIATE LIGAMENT OF RIGHT KNEE, INITIAL ENCOUNTER: Primary | ICD-10-CM

## 2025-04-24 DIAGNOSIS — S83.231A COMPLEX TEAR OF MEDIAL MENISCUS OF RIGHT KNEE AS CURRENT INJURY, INITIAL ENCOUNTER: ICD-10-CM

## 2025-04-24 PROCEDURE — 97110 THERAPEUTIC EXERCISES: CPT

## 2025-04-24 NOTE — THERAPY RE-EVALUATION
Outpatient Physical Therapy Ortho Re-Evaluation   [facility]     Patient Name: Becca Fonseca  : 1980  MRN: [id number]  Today's Date: 2025      Visit Date: 2025    Patient Active Problem List   Diagnosis    Rupture of anterior cruciate ligament of right knee    Complex tear of medial meniscus of right knee as current injury    Status post anterior cruciate ligament surgery    Encounter to establish care        Past Medical History:   Diagnosis Date    PONV (postoperative nausea and vomiting)         Past Surgical History:   Procedure Laterality Date    KNEE ACL RECONSTRUCTION Right 2024    Procedure: Knee arthroscopy, anterior cruciate ligament reconstruction with hamstring autograft, possible allograft supplement, meniscal and cartilage surgery as indicated;  Surgeon: David Mortensen MD;  Location: [facility];  Service: Orthopedics;  Laterality: Right;    TUBAL ABDOMINAL LIGATION         Visit Dx:     ICD-10-CM ICD-9-CM   1. Rupture of anterior cruciate ligament of right knee, initial encounter  S83.511A 844.2   2. Complex tear of medial meniscus of right knee as current injury, initial encounter  S83.231A 836.0              PT Ortho       Row Name 25 0600       Precautions and Contraindications    Precautions/Limitations no known precautions/limitations  -AS       Subjective Pain    Able to rate subjective pain? yes  -AS    Pre-Treatment Pain Level 0  -AS    Post-Treatment Pain Level 0  -AS       Posture/Observations    Posture- WNL Posture is WNL  -AS    Observations Incision healing;Muscle atrophy  -AS       Patellar Accessory Motions    Superior glide Right:;WNL  -AS    Inferior glide Right:;WNL  -AS    Medial glide Right:;WNL  -AS    Lateral glide Right:;WNL  -AS       Right Lower Ext    Rt Knee Extension/Flexion AROM 0-145 degrees  -AS       MMT Right Lower Ext    Rt Hip Flexion MMT, Gross Movement (4+/5) good plus  -AS    Rt Hip Extension MMT, Gross Movement (4+/5)  good plus  -AS    Rt Hip ABduction MMT, Gross Movement (4+/5) good plus  -AS    Rt Knee Extension MMT, Gross Movement (4+/5) good plus  -AS    Rt Knee Flexion MMT, Gross Movement (4/5) good  -AS       Sensation    Sensation WNL? WNL  -AS    Light Touch No apparent deficits  -AS       Lower Extremity Flexibility    Hamstrings Right:;WNL  -AS              User Key  (r) = Recorded By, (t) = Taken By, (c) = Cosigned By      Initials Name Provider Type    AS Sheldon Helms, PT Physical Therapist                                       PT OP Goals       Row Name 04/24/25 0600          PT Short Term Goals    STG 1 Patient to demonstrate compliance with initial HEP for flexibility, ROM and strengthening.  -AS     STG 1 Progress Met  -AS     STG 2 Patient to report right knee pain on VAS of 2-3/10 at worst with activity.  -AS     STG 2 Progress Ongoing;Progressing  -AS     STG 3 Patient to demonstrate improved right LE strength to 4/5 in all planes.  -AS     STG 3 Progress Met  -AS     STG 4 Patient to demonstrate improved right knee ROM to 0-1-110 degrees.  -AS     STG 4 Progress Met  -AS     STG 5 Patient to ambulate short distances without the use of an AD and with an improved gait pattern.  -AS     STG 5 Progress Met  -AS        Long Term Goals    LTG 1 Patient to demonstrate compliance with advanced HEP for flexibility, ROM and strengthening.  -AS     LTG 1 Progress Met  -AS     LTG 2 Patient to report right knee pain on VAS of 0-1/10 at worst with activity.  -AS     LTG 2 Progress Ongoing;Progressing  -AS     LTG 3 Patient to demonstrate improved right LE strength to 5/5 in all planes.  -AS     LTG 3 Progress New;Ongoing;Progressing  -AS     LTG 4 Patient to demonstrate improved right knee ROM to 0-130 degrees.  -AS     LTG 4 Progress Met  -AS     LTG 5 Patient to ambulate community distances without an AD and with a normal gait pattern.  -AS     LTG 5 Progress Met  -AS     LTG 6 Patient to report overall improved  function on LEFS to 70/80.  -AS     LTG 6 Progress Ongoing;Progressing  -AS               User Key  (r) = Recorded By, (t) = Taken By, (c) = Cosigned By      Initials Name Provider Type    AS Sheldon Helms, PT Physical Therapist                     PT Assessment/Plan       Row Name 04/24/25 0600          PT Assessment    Functional Limitations Impaired gait;Impaired locomotion;Limitation in home management;Limitations in community activities;Performance in leisure activities;Performance in sport activities;Performance in work activities  -AS     Impairments Gait;Impaired flexibility;Muscle strength;Pain  -AS     Assessment Comments Patient continues to do improve and do well with PT at this time. Plan to continue to progress patient as tolerated. Plan to decrease her PT frequency to 1x every 2 weeks.  -AS     Please refer to paper survey for additional self-reported information Yes  -AS     Rehab Potential Good  -AS     Patient/caregiver participated in establishment of treatment plan and goals Yes  -AS     Patient would benefit from skilled therapy intervention Yes  -AS        PT Plan    PT Frequency 1x/week  -AS     Predicted Duration of Therapy Intervention (PT) 4-6 weeks  -AS     Planned CPT's? PT RE-EVAL: 84758;PT THER PROC EA 15 MIN: 83607;PT THER ACT EA 15 MIN: 30766;PT MANUAL THERAPY EA 15 MIN: 26048;PT NEUROMUSC RE-EDUCATION EA 15 MIN: 56904;PT GAIT TRAINING EA 15 MIN: 52081  -AS     PT Plan Comments Continue with current treatment plan.  -AS               User Key  (r) = Recorded By, (t) = Taken By, (c) = Cosigned By      Initials Name Provider Type    AS Sheldon Helms, PT Physical Therapist                       OP Exercises       Row Name 04/24/25 0657 04/24/25 0600          Subjective    Subjective Comments -- Patient states she is doing ok. She states she is having different sensations at times. She states her leg has days where it doesn't feel as heavy. She states she was having a lot  "of swelling so she has started to wear a small knee brace with almost all activities.  -AS        Subjective Pain    Able to rate subjective pain? -- yes  -AS     Pre-Treatment Pain Level -- 0  -AS     Post-Treatment Pain Level -- 0  -AS        Total Minutes    74999 - PT Therapeutic Exercise Minutes 50  -AS --        Exercise 1    Exercise Name 1 -- Heel Slides  -AS        Exercise 2    Exercise Name 2 -- Wall Slides  -AS        Exercise 3    Exercise Name 3 -- Bike (seat 0)  -AS     Time 3 -- 5 min  -AS        Exercise 4    Exercise Name 4 -- HS Stretch  -AS     Reps 4 -- 10  -AS     Time 4 -- 10 sec hold each  -AS        Exercise 5    Exercise Name 5 -- PLH  -AS     Time 5 -- --  -AS        Exercise 6    Exercise Name 6 -- QS  -AS     Reps 6 -- 25  -AS     Time 6 -- 5 sec hold each  -AS        Exercise 7    Exercise Name 7 -- 4-Way Hip  -AS     Reps 7 -- 25 each  -AS     Time 7 -- 4#  -AS        Exercise 8    Exercise Name 8 -- HS vs Ball  -AS     Reps 8 -- 25  -AS        Exercise 9    Exercise Name 9 -- LAQ  -AS     Reps 9 -- 25  -AS        Exercise 10    Exercise Name 10 -- TKE  -AS     Reps 10 -- 25  -AS     Time 10 -- Gold  -AS        Exercise 11    Exercise Name 11 -- Standing HS Curls  -AS     Reps 11 -- 25  -AS     Time 11 -- 4#  -AS        Exercise 12    Exercise Name 12 -- Mini Squats  -AS     Reps 12 -- 25  -AS        Exercise 13    Exercise Name 13 -- 6\" FWD Step Ups  -AS     Reps 13 -- 25 each leg  -AS        Exercise 14    Exercise Name 14 -- 6\" Lateral Step Overs  -AS     Reps 14 -- 25  -AS        Exercise 15    Exercise Name 15 -- 4\" Lateral Dips  -AS     Reps 15 -- 25  -AS        Exercise 16    Exercise Name 16 -- Heel Raises  -AS     Reps 16 -- 25  -AS        Exercise 17    Exercise Name 17 -- 3-Way Cable Walks  -AS     Reps 17 -- 5x each  -AS     Time 17 -- 40#  -AS        Exercise 18    Exercise Name 18 -- Lunges  -AS     Reps 18 -- 25 each way  -AS        Exercise 19    Exercise Name 19 -- SL " Stance on Foam  -AS     Reps 19 -- 3  -AS     Time 19 -- 1 min each  -AS               User Key  (r) = Recorded By, (t) = Taken By, (c) = Cosigned By      Initials Name Provider Type    AS Sheldon Helms, PT Physical Therapist                                            Time Calculation:     Start Time: 0603  Stop Time: 0654  Time Calculation (min): 51 min  Timed Charges  27892 - PT Therapeutic Exercise Minutes: 50  Total Minutes  Timed Charges Total Minutes: 50   Total Minutes: 50     Therapy Charges for Today       Code Description Service Date Service Provider Modifiers Qty    71390409280  PT THER PROC EA 15 MIN 4/24/2025 Sheldon Helms, PT GP 2                      Sheldon Helms, PT  4/24/2025

## 2025-04-25 DIAGNOSIS — M79.2 NEUROPATHIC PAIN: ICD-10-CM

## 2025-04-25 DIAGNOSIS — Z98.890 STATUS POST ANTERIOR CRUCIATE LIGAMENT SURGERY: ICD-10-CM

## 2025-04-25 RX ORDER — PREGABALIN 75 MG/1
75 CAPSULE ORAL 2 TIMES DAILY
Qty: 60 CAPSULE | Refills: 0 | Status: SHIPPED | OUTPATIENT
Start: 2025-04-25

## 2025-04-25 NOTE — TELEPHONE ENCOUNTER
Rx Refill Note  Requested Prescriptions      No prescriptions requested or ordered in this encounter      Last office visit with prescribing clinician: 4/7/2025      Next office visit with prescribing clinician: 7/7/2025   Last Filled:3/25/25    No diagnosis found.   Date of Surgery:Status post right knee ACL reconstruction with hamstring autograft, medial meniscal repair-12/12/2024       Shelley Esparza MA  04/25/25, 14:35 EDT    Previous RX pended for your approval, change or denial.     {TIP  Encounters:    {TIP  Please add Last Relevant Lab Date if appropriate:  {TIP  Is Refill Pharmacy correct?:

## 2025-05-08 ENCOUNTER — HOSPITAL ENCOUNTER (OUTPATIENT)
Dept: PHYSICAL THERAPY | Facility: HOSPITAL | Age: 45
Setting detail: THERAPIES SERIES
Discharge: HOME | End: 2025-05-08

## 2025-05-08 DIAGNOSIS — S83.511A RUPTURE OF ANTERIOR CRUCIATE LIGAMENT OF RIGHT KNEE, INITIAL ENCOUNTER: Primary | ICD-10-CM

## 2025-05-08 DIAGNOSIS — S83.231A COMPLEX TEAR OF MEDIAL MENISCUS OF RIGHT KNEE AS CURRENT INJURY, INITIAL ENCOUNTER: ICD-10-CM

## 2025-05-08 PROCEDURE — 97110 THERAPEUTIC EXERCISES: CPT

## 2025-05-08 NOTE — THERAPY TREATMENT NOTE
Outpatient Physical Therapy Ortho Treatment Note  [facility]     Patient Name: Becca Fonseca  : 1980  MRN: [id number]  Today's Date: 2025      Visit Date: 2025    Visit Dx:    ICD-10-CM ICD-9-CM   1. Rupture of anterior cruciate ligament of right knee, initial encounter  S83.511A 844.2   2. Complex tear of medial meniscus of right knee as current injury, initial encounter  S83.231A 836.0       Patient Active Problem List   Diagnosis    Rupture of anterior cruciate ligament of right knee    Complex tear of medial meniscus of right knee as current injury    Status post anterior cruciate ligament surgery    Encounter to establish care        Past Medical History:   Diagnosis Date    PONV (postoperative nausea and vomiting)         Past Surgical History:   Procedure Laterality Date    KNEE ACL RECONSTRUCTION Right 2024    Procedure: Knee arthroscopy, anterior cruciate ligament reconstruction with hamstring autograft, possible allograft supplement, meniscal and cartilage surgery as indicated;  Surgeon: David Mortensen MD;  [facility];  Service: Orthopedics;  Laterality: Right;    TUBAL ABDOMINAL LIGATION                          PT Assessment/Plan       Row Name 25 0600          PT Assessment    Assessment Comments Patient continues to do improve and do well with PT at this time. Plan to continue to progress patient as tolerated.  -AS        PT Plan    PT Plan Comments Continue with current treatment plan.  -AS               User Key  (r) = Recorded By, (t) = Taken By, (c) = Cosigned By      Initials Name Provider Type    AS Sheldon Helms, PT Physical Therapist                       OP Exercises       Row Name 25 0738 25 0600          Subjective    Subjective Comments -- Patient states she is doing better and states she has been doing well in the gym.  -AS        Total Minutes    85298 - PT Therapeutic Exercise Minutes 40  -AS --        Exercise 1  "   Exercise Name 1 -- Heel Slides  -AS        Exercise 2    Exercise Name 2 -- Wall Slides  -AS        Exercise 3    Exercise Name 3 -- Bike (seat 0)  -AS     Time 3 -- 5 min  -AS        Exercise 4    Exercise Name 4 -- HS Stretch  -AS     Reps 4 -- 10  -AS     Time 4 -- 10 sec hold each  -AS        Exercise 5    Exercise Name 5 -- PLH  -AS        Exercise 6    Exercise Name 6 -- QS  -AS     Reps 6 -- --  -AS     Time 6 -- --  -AS        Exercise 7    Exercise Name 7 -- 4-Way Hip  -AS     Reps 7 -- 25 each  -AS     Time 7 -- 5#  -AS        Exercise 8    Exercise Name 8 -- HS vs Ball  -AS     Reps 8 -- 25  -AS        Exercise 9    Exercise Name 9 -- LAQ  -AS     Reps 9 -- 25  -AS        Exercise 10    Exercise Name 10 -- TKE  -AS     Reps 10 -- 25  -AS     Time 10 -- Gold  -AS        Exercise 11    Exercise Name 11 -- Standing HS Curls  -AS     Reps 11 -- 25  -AS     Time 11 -- 5#  -AS        Exercise 12    Exercise Name 12 -- Mini Squats  -AS     Reps 12 -- 25  -AS        Exercise 13    Exercise Name 13 -- 6\" FWD Step Ups  -AS     Reps 13 -- 25 each leg  -AS        Exercise 14    Exercise Name 14 -- 6\" Lateral Step Overs  -AS     Reps 14 -- 25  -AS        Exercise 15    Exercise Name 15 -- 6\" Lateral Dips  -AS     Reps 15 -- 25  -AS        Exercise 16    Exercise Name 16 -- Heel Raises  -AS     Reps 16 -- 25  -AS        Exercise 17    Exercise Name 17 -- 3-Way Cable Walks  -AS     Reps 17 -- 5x each  -AS     Time 17 -- 50#  -AS        Exercise 18    Exercise Name 18 -- Lunges  -AS     Reps 18 -- 25 each way  -AS        Exercise 19    Exercise Name 19 -- SL Stance on Foam  -AS     Reps 19 -- 3  -AS     Time 19 -- 1 min each  -AS        Exercise 20    Exercise Name 20 -- Y Balance  -AS     Reps 20 -- 5x each direction, each leg  -AS               User Key  (r) = Recorded By, (t) = Taken By, (c) = Cosigned By      Initials Name Provider Type    AS Sheldon Helms, PT Physical Therapist                           "                          Time Calculation:   Start Time: 0618  Stop Time: 0700  Time Calculation (min): 42 min  Timed Charges  45338 - PT Therapeutic Exercise Minutes: 40  Total Minutes  Timed Charges Total Minutes: 40   Total Minutes: 40  Therapy Charges for Today       Code Description Service Date Service Provider Modifiers Qty    74161914118 HC PT THER PROC EA 15 MIN 5/8/2025 Sheldon Helms, PT GP 2                      Sheldon Helms, PT  5/8/2025

## 2025-05-22 ENCOUNTER — HOSPITAL ENCOUNTER (OUTPATIENT)
Dept: PHYSICAL THERAPY | Facility: HOSPITAL | Age: 45
Setting detail: THERAPIES SERIES
Discharge: HOME | End: 2025-05-22

## 2025-05-22 DIAGNOSIS — S83.511A RUPTURE OF ANTERIOR CRUCIATE LIGAMENT OF RIGHT KNEE, INITIAL ENCOUNTER: Primary | ICD-10-CM

## 2025-05-22 DIAGNOSIS — S83.231A COMPLEX TEAR OF MEDIAL MENISCUS OF RIGHT KNEE AS CURRENT INJURY, INITIAL ENCOUNTER: ICD-10-CM

## 2025-05-22 PROCEDURE — 97110 THERAPEUTIC EXERCISES: CPT

## 2025-05-22 NOTE — THERAPY TREATMENT NOTE
Outpatient Physical Therapy Ortho Treatment Note  [facility]     Patient Name: Becca Fonseca  : 1980  MRN: [id number]  Today's Date: 2025      Visit Date: 2025    Visit Dx:    ICD-10-CM ICD-9-CM   1. Rupture of anterior cruciate ligament of right knee, initial encounter  S83.511A 844.2   2. Complex tear of medial meniscus of right knee as current injury, initial encounter  S83.231A 836.0       Patient Active Problem List   Diagnosis    Rupture of anterior cruciate ligament of right knee    Complex tear of medial meniscus of right knee as current injury    Status post anterior cruciate ligament surgery    Encounter to establish care        Past Medical History:   Diagnosis Date    PONV (postoperative nausea and vomiting)         Past Surgical History:   Procedure Laterality Date    KNEE ACL RECONSTRUCTION Right 2024    Procedure: Knee arthroscopy, anterior cruciate ligament reconstruction with hamstring autograft, possible allograft supplement, meniscal and cartilage surgery as indicated;  Surgeon: David Mortensen MD;  Location: [facility];  Service: Orthopedics;  Laterality: Right;    TUBAL ABDOMINAL LIGATION                          PT Assessment/Plan       Row Name 25 0600          PT Assessment    Assessment Comments Patient continues to do improve and do well with PT at this time. Plan to continue to progress patient as tolerated.  -AS        PT Plan    PT Plan Comments Continue with current treatment plan.  -AS               User Key  (r) = Recorded By, (t) = Taken By, (c) = Cosigned By      Initials Name Provider Type    AS Sheldon Helms, PT Physical Therapist                       OP Exercises       Row Name 25 0701 25 0600          Subjective    Subjective Comments -- Patient states her knee continues to improve and she is doing well overall. She states she continues to go to the gym and workout. She is also being compliant with her HEP.  -AS   "      Total Minutes    00050 - PT Therapeutic Exercise Minutes 55  -AS --        Exercise 1    Exercise Name 1 -- Heel Slides  -AS        Exercise 2    Exercise Name 2 -- Wall Slides  -AS        Exercise 3    Exercise Name 3 -- Bike (seat 0)  -AS     Time 3 -- 5 min  -AS        Exercise 4    Exercise Name 4 -- HS Stretch  -AS     Reps 4 -- 10  -AS     Time 4 -- 10 sec hold each  -AS        Exercise 5    Exercise Name 5 -- PLH  -AS        Exercise 6    Exercise Name 6 -- QS  -AS        Exercise 7    Exercise Name 7 -- 4-Way Hip  -AS     Reps 7 -- 25 each  -AS     Time 7 -- 5#  -AS        Exercise 8    Exercise Name 8 -- HS vs Ball  -AS     Reps 8 -- 25  -AS        Exercise 9    Exercise Name 9 -- LAQ  -AS     Reps 9 -- 25  -AS        Exercise 10    Exercise Name 10 -- TKE  -AS     Reps 10 -- 25  -AS     Time 10 -- Gold  -AS        Exercise 11    Exercise Name 11 -- Standing HS Curls  -AS     Reps 11 -- 25  -AS     Time 11 -- 5#  -AS        Exercise 12    Exercise Name 12 -- Mini Squats  -AS     Reps 12 -- 25  -AS        Exercise 13    Exercise Name 13 -- 6\" FWD Step Ups  -AS     Reps 13 -- 25 each leg  -AS        Exercise 14    Exercise Name 14 -- 6\" Lateral Step Overs  -AS     Reps 14 -- 25  -AS        Exercise 15    Exercise Name 15 -- 6\" Lateral Dips  -AS     Reps 15 -- 25  -AS        Exercise 16    Exercise Name 16 -- Heel Raises  -AS     Reps 16 -- 25  -AS        Exercise 17    Exercise Name 17 -- 3-Way Cable Walks  -AS     Reps 17 -- 5x each  -AS     Time 17 -- 50#  -AS        Exercise 18    Exercise Name 18 -- Lunges  -AS     Reps 18 -- 25 each way  -AS        Exercise 19    Exercise Name 19 -- SL Stance on Foam  -AS     Reps 19 -- 3  -AS     Time 19 -- 1 min each  -AS        Exercise 20    Exercise Name 20 -- Y Balance  -AS     Reps 20 -- 5x each direction, each leg  -AS               User Key  (r) = Recorded By, (t) = Taken By, (c) = Cosigned By      Initials Name Provider Type    AS Sheldon Helms, " PT Physical Therapist                                                    Time Calculation:   Start Time: 0600  Stop Time: 0658  Time Calculation (min): 58 min  Timed Charges  27089 - PT Therapeutic Exercise Minutes: 55  Total Minutes  Timed Charges Total Minutes: 55   Total Minutes: 55  Therapy Charges for Today       Code Description Service Date Service Provider Modifiers Qty    [id number]  PT THER PROC EA 15 MIN 5/22/2025 Sheldon Helms, PT GP 2                      Sheldon Helms, PT  5/22/2025

## 2025-05-28 DIAGNOSIS — Z98.890 STATUS POST ANTERIOR CRUCIATE LIGAMENT SURGERY: ICD-10-CM

## 2025-05-28 RX ORDER — PREGABALIN 50 MG/1
50 CAPSULE ORAL 3 TIMES DAILY
Qty: 90 CAPSULE | Refills: 0 | Status: SHIPPED | OUTPATIENT
Start: 2025-05-28

## 2025-05-28 NOTE — TELEPHONE ENCOUNTER
Rx Refill Note  Requested Prescriptions     Pending Prescriptions Disp Refills    pregabalin (LYRICA) 50 MG capsule 90 capsule 0     Sig: Take 1 capsule by mouth 3 (Three) Times a Day.      Last office visit with prescribing clinician: 4/7/2025      Next office visit with prescribing clinician: 7/7/2025   Last Filled:4/25/2025    Encounter Diagnosis   Name Primary?    Status post right knee ACL reconstruction with hamstring autograft, medial meniscal repair, DOS 12/12/2024       Date of Surgery:Status post right knee ACL reconstruction with hamstring autograft, medial meniscal repair-12/12/2024       Shelley Esparza MA  05/28/25, 15:40 EDT    Previous RX pended for your approval, change or denial.     {TIP  Encounters:    {TIP  Please add Last Relevant Lab Date if appropriate:  {TIP  Is Refill Pharmacy correct?:

## 2025-05-28 NOTE — TELEPHONE ENCOUNTER
Caller: LORNE HAAS    Relationship: Emergency Contact    Best call back number:     Requested Prescriptions:   Requested Prescriptions     Pending Prescriptions Disp Refills    pregabalin (LYRICA) 50 MG capsule 90 capsule 0     Sig: Take 1 capsule by mouth 3 (Three) Times a Day.        Pharmacy where request should be sent: [facility] [id number] - [place], [place] - [address] - [phone] PH - [phone] FX     Last office visit with prescribing clinician: Visit date not found   Last telemedicine visit with prescribing clinician: Visit date not found   Next office visit with prescribing clinician: Visit date not found     Does the patient have less than a 3 day supply:  [x] Yes  [] No    Would you like a call back once the refill request has been completed: [x] Yes [] No    If the office needs to give you a call back, can they leave a voicemail: [x] Yes [] No    Anum Alvarenga Rep   05/28/25 08:24 EDT

## 2025-07-07 ENCOUNTER — OFFICE VISIT (OUTPATIENT)
Dept: ORTHOPEDIC SURGERY | Facility: CLINIC | Age: 45
End: 2025-07-07
Payer: MEDICAID

## 2025-07-07 VITALS — HEIGHT: 67 IN | BODY MASS INDEX: 23.7 KG/M2 | WEIGHT: 151 LBS

## 2025-07-07 DIAGNOSIS — Z98.890 STATUS POST ANTERIOR CRUCIATE LIGAMENT SURGERY: Primary | ICD-10-CM

## 2025-07-07 RX ORDER — PREGABALIN 50 MG/1
50 CAPSULE ORAL 3 TIMES DAILY
Qty: 90 CAPSULE | Refills: 0 | Status: SHIPPED | OUTPATIENT
Start: 2025-07-07

## 2025-07-07 NOTE — PROGRESS NOTES
"Subjective:     Patient ID: Becca Fonseca is a 45 y.o. female.    Chief Complaint:  Status post right knee ACL reconstruction with hamstring autograft, medial meniscal repair-12/12/2024   History of Present Illness  The patient returns to the clinic today for a follow-up evaluation regarding her right knee. Overall, she is doing fairly well at this point in time in regards to her motion, strength, and function.    She still notes some occasional crepitus. She does not report any numbness but does still steve some tingling and neuropathic hypersensitivity over her knee. It is improved with a compression sleeve. It has been decreasing over time. She is still taking Lyrica for treatment. She has been able to wean down to 50 mg and is doing well on that dose.    Social History     Occupational History    Not on file   Tobacco Use    Smoking status: Every Day     Current packs/day: 0.75     Average packs/day: 0.7 packs/day for 4.5 years (3.4 ttl pk-yrs)     Types: Cigarettes     Start date: 2021     Passive exposure: Current    Smokeless tobacco: Never   Vaping Use    Vaping status: Never Used   Substance and Sexual Activity    Alcohol use: Yes     Comment: 1    Drug use: Never    Sexual activity: Defer      Past Medical History:   Diagnosis Date    PONV (postoperative nausea and vomiting)      Past Surgical History:   Procedure Laterality Date    KNEE ACL RECONSTRUCTION Right 12/12/2024    Procedure: Knee arthroscopy, anterior cruciate ligament reconstruction with hamstring autograft, possible allograft supplement, meniscal and cartilage surgery as indicated;  Surgeon: David Mortensen MD;  Location: [facility];  Service: Orthopedics;  Laterality: Right;    TUBAL ABDOMINAL LIGATION  2007       No family history on file.      Review of Systems        Objective:  Vitals:    07/07/25 0839   Weight: 68.5 kg (151 lb)   Height: 170.2 cm (67\")         07/07/25  0839   Weight: 68.5 kg (151 lb)     Body mass index is 23.65 " kg/m².    Physical Exam    Vital signs reviewed.   General: No acute distress, alert and oriented  Eyes: conjunctiva clear; pupils equally round and reactive  ENT: external ears and nose atraumatic; oropharynx clear  CV: no peripheral edema  Resp: normal respiratory effort  Skin: no rashes or wounds; normal turgor  Psych: mood and affect appropriate; recent and remote memory intact       Physical Exam  - Musculoskeletal:    - Right knee:      - Incision well healed      - Active range of motion 0 to 140 degrees      - Strength 5 out of 5 on flexion and extension      - Mild VMO atrophy      - Overall quad improving in strength and size      - Grade 1A Lachman      - Negative anterior and posterior drawer      - No joint line pain      - No effusion        Imaging:  None today  Assessment:        1. Status post right knee ACL reconstruction with hamstring autograft, medial meniscal repair, DOS 12/12/2024             Assessment & Plan  Right knee hypersensitivity:  She reports occasional crepitus and tingling, which improves with a compression sleeve. She is currently on Lyrica 50 mg for neuropathic pain and has been doing well on this dose. A prescription for Lyrica 50 mg has been sent to her pharmacy. For her hypersensitivity, it is recommended to continue soft tissue massage and hot and cold contrast.   A referral back to therapy for dry needling was offered but declined at this time. A muscle relaxer was also offered to help with tightness in her gastroc, which affects her knee flexion when her ankle is in a plantar flexed position, but she prefers to hold off on that for now.   She will continue working on soft tissue massage and stretching both her ankle and knee as much as possible.    Follow-up: The patient will follow up in 6 months with x-rays of the right knee ACL series.    Becca Fonseca was in agreement with plan and had all questions answered.     Orders:  No orders of the defined types were placed in  this encounter.      Medications:  No orders of the defined types were placed in this encounter.      Followup:  No follow-ups on file.    Diagnoses and all orders for this visit:    1. Status post right knee ACL reconstruction with hamstring autograft, medial meniscal repair, DOS 12/12/2024 (Primary)        BMI is within normal parameters. No other follow-up for BMI required.          Dictated utilizing Dragon dictation     Patient or patient representative verbalized consent for the use of Ambient Listening during the visit with  David Mortensen MD for chart documentation. 7/7/2025  09:07 EDT

## 2025-08-27 ENCOUNTER — APPOINTMENT (OUTPATIENT)
Dept: CT IMAGING | Facility: HOSPITAL | Age: 45
End: 2025-08-27

## 2025-08-27 ENCOUNTER — HOSPITAL ENCOUNTER (EMERGENCY)
Facility: HOSPITAL | Age: 45
Discharge: HOME | End: 2025-08-27
Attending: EMERGENCY MEDICINE

## 2025-08-27 VITALS
HEIGHT: 67 IN | RESPIRATION RATE: 22 BRPM | OXYGEN SATURATION: 100 % | TEMPERATURE: 98.1 F | SYSTOLIC BLOOD PRESSURE: 118 MMHG | DIASTOLIC BLOOD PRESSURE: 79 MMHG | WEIGHT: 170 LBS | BODY MASS INDEX: 26.68 KG/M2 | HEART RATE: 60 BPM

## 2025-08-27 DIAGNOSIS — R07.9 CHEST PAIN, UNSPECIFIED TYPE: Primary | ICD-10-CM

## 2025-08-27 LAB
ALBUMIN SERPL-MCNC: 4.4 G/DL (ref 3.5–5.2)
ALBUMIN/GLOB SERPL: 1.7 G/DL
ALP SERPL-CCNC: 60 U/L (ref 39–117)
ALT SERPL W P-5'-P-CCNC: 10 U/L (ref 1–33)
ANION GAP SERPL CALCULATED.3IONS-SCNC: 13.5 MMOL/L (ref 5–15)
AST SERPL-CCNC: 19 U/L (ref 1–32)
BASOPHILS # BLD AUTO: 0.04 10*3/MM3 (ref 0–0.2)
BASOPHILS NFR BLD AUTO: 0.6 % (ref 0–1.5)
BILIRUB SERPL-MCNC: 0.3 MG/DL (ref 0–1.2)
BUN SERPL-MCNC: 10.2 MG/DL (ref 6–20)
BUN/CREAT SERPL: 12.6 (ref 7–25)
CALCIUM SPEC-SCNC: 9.4 MG/DL (ref 8.6–10.5)
CHLORIDE SERPL-SCNC: 101 MMOL/L (ref 98–107)
CO2 SERPL-SCNC: 21.5 MMOL/L (ref 22–29)
CREAT SERPL-MCNC: 0.81 MG/DL (ref 0.57–1)
DEPRECATED RDW RBC AUTO: 45.1 FL (ref 37–54)
EGFRCR SERPLBLD CKD-EPI 2021: 91.9 ML/MIN/1.73
EOSINOPHIL # BLD AUTO: 0.04 10*3/MM3 (ref 0–0.4)
EOSINOPHIL NFR BLD AUTO: 0.6 % (ref 0.3–6.2)
ERYTHROCYTE [DISTWIDTH] IN BLOOD BY AUTOMATED COUNT: 14.1 % (ref 12.3–15.4)
GEN 5 1HR TROPONIN T REFLEX: <6 NG/L
GLOBULIN UR ELPH-MCNC: 2.6 GM/DL
GLUCOSE SERPL-MCNC: 96 MG/DL (ref 65–99)
HCT VFR BLD AUTO: 36.7 % (ref 34–46.6)
HGB BLD-MCNC: 11.7 G/DL (ref 12–15.9)
IMM GRANULOCYTES # BLD AUTO: 0 10*3/MM3 (ref 0–0.05)
IMM GRANULOCYTES NFR BLD AUTO: 0 % (ref 0–0.5)
LYMPHOCYTES # BLD AUTO: 1.98 10*3/MM3 (ref 0.7–3.1)
LYMPHOCYTES NFR BLD AUTO: 29.9 % (ref 19.6–45.3)
MCH RBC QN AUTO: 27.8 PG (ref 26.6–33)
MCHC RBC AUTO-ENTMCNC: 31.9 G/DL (ref 31.5–35.7)
MCV RBC AUTO: 87.2 FL (ref 79–97)
MONOCYTES # BLD AUTO: 0.6 10*3/MM3 (ref 0.1–0.9)
MONOCYTES NFR BLD AUTO: 9.1 % (ref 5–12)
NEUTROPHILS NFR BLD AUTO: 3.96 10*3/MM3 (ref 1.7–7)
NEUTROPHILS NFR BLD AUTO: 59.8 % (ref 42.7–76)
PLATELET # BLD AUTO: 267 10*3/MM3 (ref 140–450)
PMV BLD AUTO: 10.3 FL (ref 6–12)
POTASSIUM SERPL-SCNC: 3.7 MMOL/L (ref 3.5–5.2)
PROT SERPL-MCNC: 7 G/DL (ref 6–8.5)
QT INTERVAL: 401 MS
QTC INTERVAL: 427 MS
RBC # BLD AUTO: 4.21 10*6/MM3 (ref 3.77–5.28)
SODIUM SERPL-SCNC: 136 MMOL/L (ref 136–145)
TROPONIN T NUMERIC DELTA: NORMAL
TROPONIN T SERPL HS-MCNC: <6 NG/L
WBC NRBC COR # BLD AUTO: 6.62 10*3/MM3 (ref 3.4–10.8)

## 2025-08-27 PROCEDURE — 93005 ELECTROCARDIOGRAM TRACING: CPT | Performed by: EMERGENCY MEDICINE

## 2025-08-27 PROCEDURE — 80053 COMPREHEN METABOLIC PANEL: CPT | Performed by: EMERGENCY MEDICINE

## 2025-08-27 PROCEDURE — 84484 ASSAY OF TROPONIN QUANT: CPT | Performed by: EMERGENCY MEDICINE

## 2025-08-27 PROCEDURE — 25510000001 IOPAMIDOL PER 1 ML: Performed by: EMERGENCY MEDICINE

## 2025-08-27 PROCEDURE — 71275 CT ANGIOGRAPHY CHEST: CPT

## 2025-08-27 PROCEDURE — 99285 EMERGENCY DEPT VISIT HI MDM: CPT | Performed by: EMERGENCY MEDICINE

## 2025-08-27 PROCEDURE — 85025 COMPLETE CBC W/AUTO DIFF WBC: CPT | Performed by: EMERGENCY MEDICINE

## 2025-08-27 RX ORDER — IOPAMIDOL 755 MG/ML
82 INJECTION, SOLUTION INTRAVASCULAR
Status: COMPLETED | OUTPATIENT
Start: 2025-08-27 | End: 2025-08-27

## 2025-08-27 RX ADMIN — IOPAMIDOL 82 ML: 755 INJECTION, SOLUTION INTRAVENOUS at 11:58

## (undated) DEVICE — SUT MNCRYL 3/0 PS2 18IN Y497G

## (undated) DEVICE — ANTIBACTERIAL UNDYED BRAIDED (POLYGLACTIN 910), SYNTHETIC ABSORBABLE SUTURE: Brand: COATED VICRYL

## (undated) DEVICE — PAD,NON-ADHERENT,3X8,STERILE,LF,1/PK: Brand: MEDLINE

## (undated) DEVICE — WRAP KN COMPR COLD/THERP

## (undated) DEVICE — DRESSING,GAUZE,XEROFORM,CURAD,1"X8",ST: Brand: CURAD

## (undated) DEVICE — SOL IRR H2O BO 1000ML STRL

## (undated) DEVICE — PILOT DRILL FOR USE WITH MITEK CORTICAL & CANCELLOUS SCREWS 3.2MM

## (undated) DEVICE — SUT VIC 0 UR6 27IN VCP603H

## (undated) DEVICE — BLD SCLPL SURG PREM SS SZ15C

## (undated) DEVICE — LAG ARTHROSCOPY: Brand: MEDLINE INDUSTRIES, INC.

## (undated) DEVICE — SYS CLS SKIN PREMIERPRO EXOFINFUSION 22CM

## (undated) DEVICE — PADDING,UNDERCAST,COTTON, 4"X4YD STERILE: Brand: MEDLINE

## (undated) DEVICE — SUT ETHLN 3/0 PS2 18 IN 1669H

## (undated) DEVICE — Device

## (undated) DEVICE — DRSNG SURESITE WNDW 4X4.5

## (undated) DEVICE — GLV SURG SENSICARE PI LF PF 7.0

## (undated) DEVICE — GLV SURG SENSICARE PI MIC PF SZ8 LF STRL

## (undated) DEVICE — TOWEL,OR,DSP,ST,BLUE,STD,4/PK,20PK/CS: Brand: MEDLINE

## (undated) DEVICE — TUBING, SUCTION, 1/4" X 20', STRAIGHT: Brand: MEDLINE INDUSTRIES, INC.

## (undated) DEVICE — PK BASIC ORTHO 90

## (undated) DEVICE — COVER,MAYO STAND,STERILE: Brand: MEDLINE

## (undated) DEVICE — GLV SURG SENSICARE PF POLYISPRN SZ8 LF

## (undated) DEVICE — DYONICS 5.5 FULL RADIUS BONECUTTER                                    BLADES, ORANGE, 8000 MAXIMUM RPM,                                    PACKAGED 6 PER BOX, STERILE

## (undated) DEVICE — SPNG GZ WOVN 4X4IN 12PLY 10/BX STRL

## (undated) DEVICE — ACUFEX TRUNAV RETROGRADE DRILL 8.5 MM: Brand: ACUFEX

## (undated) DEVICE — WEREWOLF FLOW 50 COBLATION WAND: Brand: COBLATION

## (undated) DEVICE — SKIN PREP TRAY W/CHG: Brand: MEDLINE INDUSTRIES, INC.

## (undated) DEVICE — INTENDED FOR TISSUE SEPARATION, AND OTHER PROCEDURES THAT REQUIRE A SHARP SURGICAL BLADE TO PUNCTURE OR CUT.: Brand: BARD-PARKER ® STAINLESS STEEL BLADES

## (undated) DEVICE — 3M™ IOBAN™ 2 ANTIMICROBIAL INCISE DRAPE 6650EZ: Brand: IOBAN™ 2

## (undated) DEVICE — SUT ABS VICRLY/PLS COAT CR 2/0 CP/2/REV/CUT/NDL 8X18IN UD

## (undated) DEVICE — PREP SOL POVIDONE/IODINE BT 4OZ

## (undated) DEVICE — DRSNG PAD ABD 8X10IN STRL

## (undated) DEVICE — MAT FLR ABSORBENT LG 4FT 10 2.5FT

## (undated) DEVICE — SOL ISO/ALC RUB 70PCT 4OZ

## (undated) DEVICE — BNDG,ELSTC,MATRIX,STRL,4"X5YD,LF,HOOK&LP: Brand: MEDLINE

## (undated) DEVICE — GLV SURG SENSICARE PI PF LF 7 GRN STRL

## (undated) DEVICE — ENDOSCOPIC CANNULATED DRILL BIT,                                    4.5 MM, STERILE

## (undated) DEVICE — ADAPT DB SPIKE 2PCT FOR AR6400 TBG

## (undated) DEVICE — 1.2 RAP-PAC B LATEX FREE: Brand: RAP-PAC

## (undated) DEVICE — SYS IRR SURGIPHOR A/MIC RTU BO PVPI 450ML STRL

## (undated) DEVICE — ST TB GOFLO STRL